# Patient Record
Sex: MALE | Race: WHITE | NOT HISPANIC OR LATINO | URBAN - METROPOLITAN AREA
[De-identification: names, ages, dates, MRNs, and addresses within clinical notes are randomized per-mention and may not be internally consistent; named-entity substitution may affect disease eponyms.]

---

## 2017-01-01 ENCOUNTER — INPATIENT (INPATIENT)
Facility: HOSPITAL | Age: 72
LOS: 2 days | End: 2017-03-08
Attending: INTERNAL MEDICINE | Admitting: INTERNAL MEDICINE
Payer: MEDICAID

## 2017-01-01 ENCOUNTER — INPATIENT (INPATIENT)
Facility: HOSPITAL | Age: 72
LOS: 11 days | Discharge: NURSING, REHAB OR LONG-TERM CARE | End: 2017-03-03
Attending: HOSPITALIST | Admitting: HOSPITALIST
Payer: MEDICAID

## 2017-01-01 VITALS
RESPIRATION RATE: 24 BRPM | TEMPERATURE: 99 F | DIASTOLIC BLOOD PRESSURE: 82 MMHG | OXYGEN SATURATION: 91 % | HEART RATE: 111 BPM | SYSTOLIC BLOOD PRESSURE: 142 MMHG

## 2017-01-01 VITALS
OXYGEN SATURATION: 85 % | TEMPERATURE: 99 F | RESPIRATION RATE: 40 BRPM | SYSTOLIC BLOOD PRESSURE: 117 MMHG | DIASTOLIC BLOOD PRESSURE: 67 MMHG | HEART RATE: 133 BPM

## 2017-01-01 VITALS
HEART RATE: 108 BPM | RESPIRATION RATE: 30 BRPM | DIASTOLIC BLOOD PRESSURE: 59 MMHG | OXYGEN SATURATION: 90 % | SYSTOLIC BLOOD PRESSURE: 130 MMHG

## 2017-01-01 VITALS
SYSTOLIC BLOOD PRESSURE: 129 MMHG | DIASTOLIC BLOOD PRESSURE: 76 MMHG | RESPIRATION RATE: 20 BRPM | OXYGEN SATURATION: 94 % | HEART RATE: 157 BPM

## 2017-01-01 DIAGNOSIS — I10 ESSENTIAL (PRIMARY) HYPERTENSION: ICD-10-CM

## 2017-01-01 DIAGNOSIS — J96.91 RESPIRATORY FAILURE, UNSPECIFIED WITH HYPOXIA: ICD-10-CM

## 2017-01-01 DIAGNOSIS — I25.10 ATHEROSCLEROTIC HEART DISEASE OF NATIVE CORONARY ARTERY WITHOUT ANGINA PECTORIS: ICD-10-CM

## 2017-01-01 DIAGNOSIS — Z41.8 ENCOUNTER FOR OTHER PROCEDURES FOR PURPOSES OTHER THAN REMEDYING HEALTH STATE: ICD-10-CM

## 2017-01-01 DIAGNOSIS — J18.9 PNEUMONIA, UNSPECIFIED ORGANISM: ICD-10-CM

## 2017-01-01 DIAGNOSIS — A41.9 SEPSIS, UNSPECIFIED ORGANISM: ICD-10-CM

## 2017-01-01 DIAGNOSIS — G20 PARKINSON'S DISEASE: ICD-10-CM

## 2017-01-01 DIAGNOSIS — N40.0 BENIGN PROSTATIC HYPERPLASIA WITHOUT LOWER URINARY TRACT SYMPTOMS: ICD-10-CM

## 2017-01-01 DIAGNOSIS — N39.0 URINARY TRACT INFECTION, SITE NOT SPECIFIED: ICD-10-CM

## 2017-01-01 DIAGNOSIS — J69.0 PNEUMONITIS DUE TO INHALATION OF FOOD AND VOMIT: ICD-10-CM

## 2017-01-01 DIAGNOSIS — Z71.89 OTHER SPECIFIED COUNSELING: ICD-10-CM

## 2017-01-01 DIAGNOSIS — F20.9 SCHIZOPHRENIA, UNSPECIFIED: ICD-10-CM

## 2017-01-01 DIAGNOSIS — E87.0 HYPEROSMOLALITY AND HYPERNATREMIA: ICD-10-CM

## 2017-01-01 DIAGNOSIS — R09.02 HYPOXEMIA: ICD-10-CM

## 2017-01-01 DIAGNOSIS — N17.9 ACUTE KIDNEY FAILURE, UNSPECIFIED: ICD-10-CM

## 2017-01-01 LAB
-  CEFAZOLIN: SIGNIFICANT CHANGE UP
-  CIPROFLOXACIN: SIGNIFICANT CHANGE UP
-  CLINDAMYCIN: SIGNIFICANT CHANGE UP
-  DAPTOMYCIN: SIGNIFICANT CHANGE UP
-  ERYTHROMYCIN: SIGNIFICANT CHANGE UP
-  GENTAMICIN: SIGNIFICANT CHANGE UP
-  LINEZOLID: SIGNIFICANT CHANGE UP
-  MOXIFLOXACIN(AEROBIC): SIGNIFICANT CHANGE UP
-  OXACILLIN: SIGNIFICANT CHANGE UP
-  PENICILLIN: SIGNIFICANT CHANGE UP
-  RIFAMPIN.: SIGNIFICANT CHANGE UP
-  TETRACYCLINE: SIGNIFICANT CHANGE UP
-  TRIMETHOPRIM/SULFAMETHOXAZOLE: SIGNIFICANT CHANGE UP
-  VANCOMYCIN: SIGNIFICANT CHANGE UP
ALBUMIN SERPL ELPH-MCNC: 2.5 G/DL — LOW (ref 3.3–5)
ALBUMIN SERPL ELPH-MCNC: 2.8 G/DL — LOW (ref 3.3–5)
ALBUMIN SERPL ELPH-MCNC: 2.9 G/DL — LOW (ref 3.3–5)
ALBUMIN SERPL ELPH-MCNC: 3.3 G/DL — SIGNIFICANT CHANGE UP (ref 3.3–5)
ALP SERPL-CCNC: 39 U/L — LOW (ref 40–120)
ALP SERPL-CCNC: 45 U/L — SIGNIFICANT CHANGE UP (ref 40–120)
ALP SERPL-CCNC: 51 U/L — SIGNIFICANT CHANGE UP (ref 40–120)
ALP SERPL-CCNC: 56 U/L — SIGNIFICANT CHANGE UP (ref 40–120)
ALT FLD-CCNC: 27 U/L — SIGNIFICANT CHANGE UP (ref 4–41)
ALT FLD-CCNC: 34 U/L — SIGNIFICANT CHANGE UP (ref 4–41)
ALT FLD-CCNC: 37 U/L — SIGNIFICANT CHANGE UP (ref 4–41)
ALT FLD-CCNC: 7 U/L — SIGNIFICANT CHANGE UP (ref 4–41)
APPEARANCE UR: CLEAR — SIGNIFICANT CHANGE UP
APPEARANCE UR: CLEAR — SIGNIFICANT CHANGE UP
APPEARANCE UR: SIGNIFICANT CHANGE UP
APTT BLD: 27.7 SEC — SIGNIFICANT CHANGE UP (ref 27.5–37.4)
AST SERPL-CCNC: 21 U/L — SIGNIFICANT CHANGE UP (ref 4–40)
AST SERPL-CCNC: 21 U/L — SIGNIFICANT CHANGE UP (ref 4–40)
AST SERPL-CCNC: 24 U/L — SIGNIFICANT CHANGE UP (ref 4–40)
AST SERPL-CCNC: 39 U/L — SIGNIFICANT CHANGE UP (ref 4–40)
B PERT DNA SPEC QL NAA+PROBE: SIGNIFICANT CHANGE UP
BACTERIA # UR AUTO: SIGNIFICANT CHANGE UP
BACTERIA # UR AUTO: SIGNIFICANT CHANGE UP
BACTERIA BLD CULT: SIGNIFICANT CHANGE UP
BACTERIA SPT RESP CULT: SIGNIFICANT CHANGE UP
BACTERIA UR CULT: SIGNIFICANT CHANGE UP
BASE EXCESS BLDA CALC-SCNC: -2.4 MMOL/L — SIGNIFICANT CHANGE UP
BASE EXCESS BLDV CALC-SCNC: -0.4 MMOL/L — SIGNIFICANT CHANGE UP
BASE EXCESS BLDV CALC-SCNC: -1.9 MMOL/L — SIGNIFICANT CHANGE UP
BASE EXCESS BLDV CALC-SCNC: -2.5 MMOL/L — SIGNIFICANT CHANGE UP
BASE EXCESS BLDV CALC-SCNC: 1.5 MMOL/L — SIGNIFICANT CHANGE UP
BASE EXCESS BLDV CALC-SCNC: 2 MMOL/L — SIGNIFICANT CHANGE UP
BASE EXCESS BLDV CALC-SCNC: 3.9 MMOL/L — SIGNIFICANT CHANGE UP
BASOPHILS # BLD AUTO: 0.03 K/UL — SIGNIFICANT CHANGE UP (ref 0–0.2)
BASOPHILS # BLD AUTO: 0.04 K/UL — SIGNIFICANT CHANGE UP (ref 0–0.2)
BASOPHILS # BLD AUTO: 0.09 K/UL — SIGNIFICANT CHANGE UP (ref 0–0.2)
BASOPHILS NFR BLD AUTO: 0.2 % — SIGNIFICANT CHANGE UP (ref 0–2)
BASOPHILS NFR BLD AUTO: 0.2 % — SIGNIFICANT CHANGE UP (ref 0–2)
BASOPHILS NFR BLD AUTO: 0.3 % — SIGNIFICANT CHANGE UP (ref 0–2)
BASOPHILS NFR BLD AUTO: 0.5 % — SIGNIFICANT CHANGE UP (ref 0–2)
BASOPHILS NFR BLD AUTO: 0.5 % — SIGNIFICANT CHANGE UP (ref 0–2)
BASOPHILS NFR SPEC: 0 % — SIGNIFICANT CHANGE UP (ref 0–2)
BASOPHILS NFR SPEC: 0 % — SIGNIFICANT CHANGE UP (ref 0–2)
BILIRUB SERPL-MCNC: 0.4 MG/DL — SIGNIFICANT CHANGE UP (ref 0.2–1.2)
BILIRUB SERPL-MCNC: 0.4 MG/DL — SIGNIFICANT CHANGE UP (ref 0.2–1.2)
BILIRUB SERPL-MCNC: 0.8 MG/DL — SIGNIFICANT CHANGE UP (ref 0.2–1.2)
BILIRUB SERPL-MCNC: 0.8 MG/DL — SIGNIFICANT CHANGE UP (ref 0.2–1.2)
BILIRUB UR-MCNC: NEGATIVE — SIGNIFICANT CHANGE UP
BLOOD GAS VENOUS - CREATININE: 0.59 MG/DL — SIGNIFICANT CHANGE UP (ref 0.5–1.3)
BLOOD GAS VENOUS - CREATININE: 0.79 MG/DL — SIGNIFICANT CHANGE UP (ref 0.5–1.3)
BLOOD GAS VENOUS - CREATININE: 0.96 MG/DL — SIGNIFICANT CHANGE UP (ref 0.5–1.3)
BLOOD GAS VENOUS - CREATININE: 1.73 MG/DL — HIGH (ref 0.5–1.3)
BLOOD UR QL VISUAL: HIGH
BLOOD UR QL VISUAL: HIGH
BLOOD UR QL VISUAL: NEGATIVE — SIGNIFICANT CHANGE UP
BUN SERPL-MCNC: 11 MG/DL — SIGNIFICANT CHANGE UP (ref 7–23)
BUN SERPL-MCNC: 13 MG/DL — SIGNIFICANT CHANGE UP (ref 7–23)
BUN SERPL-MCNC: 14 MG/DL — SIGNIFICANT CHANGE UP (ref 7–23)
BUN SERPL-MCNC: 14 MG/DL — SIGNIFICANT CHANGE UP (ref 7–23)
BUN SERPL-MCNC: 15 MG/DL — SIGNIFICANT CHANGE UP (ref 7–23)
BUN SERPL-MCNC: 17 MG/DL — SIGNIFICANT CHANGE UP (ref 7–23)
BUN SERPL-MCNC: 19 MG/DL — SIGNIFICANT CHANGE UP (ref 7–23)
BUN SERPL-MCNC: 21 MG/DL — SIGNIFICANT CHANGE UP (ref 7–23)
BUN SERPL-MCNC: 21 MG/DL — SIGNIFICANT CHANGE UP (ref 7–23)
BUN SERPL-MCNC: 23 MG/DL — SIGNIFICANT CHANGE UP (ref 7–23)
BUN SERPL-MCNC: 24 MG/DL — HIGH (ref 7–23)
BUN SERPL-MCNC: 26 MG/DL — HIGH (ref 7–23)
BUN SERPL-MCNC: 26 MG/DL — HIGH (ref 7–23)
BUN SERPL-MCNC: 27 MG/DL — HIGH (ref 7–23)
BUN SERPL-MCNC: 28 MG/DL — HIGH (ref 7–23)
BUN SERPL-MCNC: 29 MG/DL — HIGH (ref 7–23)
BUN SERPL-MCNC: 36 MG/DL — HIGH (ref 7–23)
BUN SERPL-MCNC: 41 MG/DL — HIGH (ref 7–23)
C PNEUM DNA SPEC QL NAA+PROBE: NOT DETECTED — SIGNIFICANT CHANGE UP
CALCIUM SERPL-MCNC: 8.1 MG/DL — LOW (ref 8.4–10.5)
CALCIUM SERPL-MCNC: 8.2 MG/DL — LOW (ref 8.4–10.5)
CALCIUM SERPL-MCNC: 8.2 MG/DL — LOW (ref 8.4–10.5)
CALCIUM SERPL-MCNC: 8.3 MG/DL — LOW (ref 8.4–10.5)
CALCIUM SERPL-MCNC: 8.4 MG/DL — SIGNIFICANT CHANGE UP (ref 8.4–10.5)
CALCIUM SERPL-MCNC: 8.5 MG/DL — SIGNIFICANT CHANGE UP (ref 8.4–10.5)
CALCIUM SERPL-MCNC: 8.6 MG/DL — SIGNIFICANT CHANGE UP (ref 8.4–10.5)
CALCIUM SERPL-MCNC: 8.6 MG/DL — SIGNIFICANT CHANGE UP (ref 8.4–10.5)
CALCIUM SERPL-MCNC: 8.8 MG/DL — SIGNIFICANT CHANGE UP (ref 8.4–10.5)
CALCIUM SERPL-MCNC: 9.1 MG/DL — SIGNIFICANT CHANGE UP (ref 8.4–10.5)
CALCIUM SERPL-MCNC: 9.2 MG/DL — SIGNIFICANT CHANGE UP (ref 8.4–10.5)
CHLORIDE BLDA-SCNC: > 120 MMOL/L — HIGH (ref 96–108)
CHLORIDE BLDV-SCNC: 106 MMOL/L — SIGNIFICANT CHANGE UP (ref 96–108)
CHLORIDE BLDV-SCNC: 119 MMOL/L — HIGH (ref 96–108)
CHLORIDE BLDV-SCNC: > 120 MMOL/L — HIGH (ref 96–108)
CHLORIDE BLDV-SCNC: > 120 MMOL/L — HIGH (ref 96–108)
CHLORIDE SERPL-SCNC: 103 MMOL/L — SIGNIFICANT CHANGE UP (ref 98–107)
CHLORIDE SERPL-SCNC: 104 MMOL/L — SIGNIFICANT CHANGE UP (ref 98–107)
CHLORIDE SERPL-SCNC: 105 MMOL/L — SIGNIFICANT CHANGE UP (ref 98–107)
CHLORIDE SERPL-SCNC: 105 MMOL/L — SIGNIFICANT CHANGE UP (ref 98–107)
CHLORIDE SERPL-SCNC: 106 MMOL/L — SIGNIFICANT CHANGE UP (ref 98–107)
CHLORIDE SERPL-SCNC: 106 MMOL/L — SIGNIFICANT CHANGE UP (ref 98–107)
CHLORIDE SERPL-SCNC: 108 MMOL/L — HIGH (ref 98–107)
CHLORIDE SERPL-SCNC: 109 MMOL/L — HIGH (ref 98–107)
CHLORIDE SERPL-SCNC: 110 MMOL/L — HIGH (ref 98–107)
CHLORIDE SERPL-SCNC: 111 MMOL/L — HIGH (ref 98–107)
CHLORIDE SERPL-SCNC: 111 MMOL/L — HIGH (ref 98–107)
CHLORIDE SERPL-SCNC: 112 MMOL/L — HIGH (ref 98–107)
CHLORIDE SERPL-SCNC: 113 MMOL/L — HIGH (ref 98–107)
CHLORIDE SERPL-SCNC: 114 MMOL/L — HIGH (ref 98–107)
CHLORIDE SERPL-SCNC: 116 MMOL/L — HIGH (ref 98–107)
CHLORIDE SERPL-SCNC: 117 MMOL/L — HIGH (ref 98–107)
CK MB BLD-MCNC: 1.8 — SIGNIFICANT CHANGE UP (ref 0–2.5)
CK MB BLD-MCNC: 2.2 — SIGNIFICANT CHANGE UP (ref 0–2.5)
CK MB BLD-MCNC: 4.56 NG/ML — SIGNIFICANT CHANGE UP (ref 1–6.6)
CK MB BLD-MCNC: 6.46 NG/ML — SIGNIFICANT CHANGE UP (ref 1–6.6)
CK SERPL-CCNC: 210 U/L — HIGH (ref 30–200)
CK SERPL-CCNC: 284 U/L — HIGH (ref 30–200)
CK SERPL-CCNC: 367 U/L — HIGH (ref 30–200)
CK SERPL-CCNC: 433 U/L — HIGH (ref 30–200)
CO2 SERPL-SCNC: 15 MMOL/L — LOW (ref 22–31)
CO2 SERPL-SCNC: 18 MMOL/L — LOW (ref 22–31)
CO2 SERPL-SCNC: 19 MMOL/L — LOW (ref 22–31)
CO2 SERPL-SCNC: 21 MMOL/L — LOW (ref 22–31)
CO2 SERPL-SCNC: 22 MMOL/L — SIGNIFICANT CHANGE UP (ref 22–31)
CO2 SERPL-SCNC: 23 MMOL/L — SIGNIFICANT CHANGE UP (ref 22–31)
CO2 SERPL-SCNC: 24 MMOL/L — SIGNIFICANT CHANGE UP (ref 22–31)
CO2 SERPL-SCNC: 25 MMOL/L — SIGNIFICANT CHANGE UP (ref 22–31)
COLOR SPEC: YELLOW — SIGNIFICANT CHANGE UP
CREAT BLDA-MCNC: 0.65 MG/DL — SIGNIFICANT CHANGE UP (ref 0.5–1.3)
CREAT SERPL-MCNC: 0.53 MG/DL — SIGNIFICANT CHANGE UP (ref 0.5–1.3)
CREAT SERPL-MCNC: 0.56 MG/DL — SIGNIFICANT CHANGE UP (ref 0.5–1.3)
CREAT SERPL-MCNC: 0.57 MG/DL — SIGNIFICANT CHANGE UP (ref 0.5–1.3)
CREAT SERPL-MCNC: 0.61 MG/DL — SIGNIFICANT CHANGE UP (ref 0.5–1.3)
CREAT SERPL-MCNC: 0.63 MG/DL — SIGNIFICANT CHANGE UP (ref 0.5–1.3)
CREAT SERPL-MCNC: 0.68 MG/DL — SIGNIFICANT CHANGE UP (ref 0.5–1.3)
CREAT SERPL-MCNC: 0.95 MG/DL — SIGNIFICANT CHANGE UP (ref 0.5–1.3)
CREAT SERPL-MCNC: 1.03 MG/DL — SIGNIFICANT CHANGE UP (ref 0.5–1.3)
CREAT SERPL-MCNC: 1.03 MG/DL — SIGNIFICANT CHANGE UP (ref 0.5–1.3)
CREAT SERPL-MCNC: 1.13 MG/DL — SIGNIFICANT CHANGE UP (ref 0.5–1.3)
CREAT SERPL-MCNC: 1.18 MG/DL — SIGNIFICANT CHANGE UP (ref 0.5–1.3)
CREAT SERPL-MCNC: 1.19 MG/DL — SIGNIFICANT CHANGE UP (ref 0.5–1.3)
CREAT SERPL-MCNC: 1.19 MG/DL — SIGNIFICANT CHANGE UP (ref 0.5–1.3)
CREAT SERPL-MCNC: 1.21 MG/DL — SIGNIFICANT CHANGE UP (ref 0.5–1.3)
CREAT SERPL-MCNC: 1.22 MG/DL — SIGNIFICANT CHANGE UP (ref 0.5–1.3)
CREAT SERPL-MCNC: 1.27 MG/DL — SIGNIFICANT CHANGE UP (ref 0.5–1.3)
CREAT SERPL-MCNC: 1.34 MG/DL — HIGH (ref 0.5–1.3)
CREAT SERPL-MCNC: 1.66 MG/DL — HIGH (ref 0.5–1.3)
EOSINOPHIL # BLD AUTO: 0 K/UL — SIGNIFICANT CHANGE UP (ref 0–0.5)
EOSINOPHIL # BLD AUTO: 0.01 K/UL — SIGNIFICANT CHANGE UP (ref 0–0.5)
EOSINOPHIL # BLD AUTO: 0.01 K/UL — SIGNIFICANT CHANGE UP (ref 0–0.5)
EOSINOPHIL # BLD AUTO: 0.05 K/UL — SIGNIFICANT CHANGE UP (ref 0–0.5)
EOSINOPHIL # BLD AUTO: 0.15 K/UL — SIGNIFICANT CHANGE UP (ref 0–0.5)
EOSINOPHIL NFR BLD AUTO: 0 % — SIGNIFICANT CHANGE UP (ref 0–6)
EOSINOPHIL NFR BLD AUTO: 0.1 % — SIGNIFICANT CHANGE UP (ref 0–6)
EOSINOPHIL NFR BLD AUTO: 0.1 % — SIGNIFICANT CHANGE UP (ref 0–6)
EOSINOPHIL NFR BLD AUTO: 0.5 % — SIGNIFICANT CHANGE UP (ref 0–6)
EOSINOPHIL NFR BLD AUTO: 1.8 % — SIGNIFICANT CHANGE UP (ref 0–6)
EOSINOPHIL NFR FLD: 0 % — SIGNIFICANT CHANGE UP (ref 0–6)
EOSINOPHIL NFR FLD: 0 % — SIGNIFICANT CHANGE UP (ref 0–6)
FLUAV H1 2009 PAND RNA SPEC QL NAA+PROBE: NOT DETECTED — SIGNIFICANT CHANGE UP
FLUAV H1 RNA SPEC QL NAA+PROBE: NOT DETECTED — SIGNIFICANT CHANGE UP
FLUAV H3 RNA SPEC QL NAA+PROBE: NOT DETECTED — SIGNIFICANT CHANGE UP
FLUAV SUBTYP SPEC NAA+PROBE: SIGNIFICANT CHANGE UP
FLUBV RNA SPEC QL NAA+PROBE: NOT DETECTED — SIGNIFICANT CHANGE UP
GAS PNL BLDV: 135 MMOL/L — LOW (ref 136–146)
GAS PNL BLDV: 143 MMOL/L — SIGNIFICANT CHANGE UP (ref 136–146)
GAS PNL BLDV: 145 MMOL/L — SIGNIFICANT CHANGE UP (ref 136–146)
GAS PNL BLDV: 147 MMOL/L — HIGH (ref 136–146)
GAS PNL BLDV: 147 MMOL/L — HIGH (ref 136–146)
GAS PNL BLDV: 152 MMOL/L — HIGH (ref 136–146)
GLUCOSE BLDA-MCNC: 129 MG/DL — HIGH (ref 70–99)
GLUCOSE BLDV-MCNC: 102 — HIGH (ref 70–99)
GLUCOSE BLDV-MCNC: 105 — HIGH (ref 70–99)
GLUCOSE BLDV-MCNC: 122 — HIGH (ref 70–99)
GLUCOSE BLDV-MCNC: 143 — HIGH (ref 70–99)
GLUCOSE BLDV-MCNC: 165 — HIGH (ref 70–99)
GLUCOSE BLDV-MCNC: 190 — HIGH (ref 70–99)
GLUCOSE SERPL-MCNC: 100 MG/DL — HIGH (ref 70–99)
GLUCOSE SERPL-MCNC: 106 MG/DL — HIGH (ref 70–99)
GLUCOSE SERPL-MCNC: 109 MG/DL — HIGH (ref 70–99)
GLUCOSE SERPL-MCNC: 109 MG/DL — HIGH (ref 70–99)
GLUCOSE SERPL-MCNC: 114 MG/DL — HIGH (ref 70–99)
GLUCOSE SERPL-MCNC: 119 MG/DL — HIGH (ref 70–99)
GLUCOSE SERPL-MCNC: 121 MG/DL — HIGH (ref 70–99)
GLUCOSE SERPL-MCNC: 131 MG/DL — HIGH (ref 70–99)
GLUCOSE SERPL-MCNC: 132 MG/DL — HIGH (ref 70–99)
GLUCOSE SERPL-MCNC: 154 MG/DL — HIGH (ref 70–99)
GLUCOSE SERPL-MCNC: 196 MG/DL — HIGH (ref 70–99)
GLUCOSE SERPL-MCNC: 66 MG/DL — LOW (ref 70–99)
GLUCOSE SERPL-MCNC: 83 MG/DL — SIGNIFICANT CHANGE UP (ref 70–99)
GLUCOSE SERPL-MCNC: 85 MG/DL — SIGNIFICANT CHANGE UP (ref 70–99)
GLUCOSE SERPL-MCNC: 92 MG/DL — SIGNIFICANT CHANGE UP (ref 70–99)
GLUCOSE SERPL-MCNC: 93 MG/DL — SIGNIFICANT CHANGE UP (ref 70–99)
GLUCOSE SERPL-MCNC: 97 MG/DL — SIGNIFICANT CHANGE UP (ref 70–99)
GLUCOSE SERPL-MCNC: 99 MG/DL — SIGNIFICANT CHANGE UP (ref 70–99)
GLUCOSE UR-MCNC: 150 — SIGNIFICANT CHANGE UP
GLUCOSE UR-MCNC: NEGATIVE — SIGNIFICANT CHANGE UP
GLUCOSE UR-MCNC: NEGATIVE — SIGNIFICANT CHANGE UP
GRAM STN SPT: SIGNIFICANT CHANGE UP
GRAN CASTS # UR COMP ASSIST: SIGNIFICANT CHANGE UP
HADV DNA SPEC QL NAA+PROBE: NOT DETECTED — SIGNIFICANT CHANGE UP
HCO3 BLDA-SCNC: 23 MMOL/L — SIGNIFICANT CHANGE UP (ref 22–26)
HCO3 BLDV-SCNC: 21 MMOL/L — SIGNIFICANT CHANGE UP (ref 20–27)
HCO3 BLDV-SCNC: 23 MMOL/L — SIGNIFICANT CHANGE UP (ref 20–27)
HCO3 BLDV-SCNC: 24 MMOL/L — SIGNIFICANT CHANGE UP (ref 20–27)
HCO3 BLDV-SCNC: 25 MMOL/L — SIGNIFICANT CHANGE UP (ref 20–27)
HCO3 BLDV-SCNC: 27 MMOL/L — SIGNIFICANT CHANGE UP (ref 20–27)
HCO3 BLDV-SCNC: 28 MMOL/L — HIGH (ref 20–27)
HCOV 229E RNA SPEC QL NAA+PROBE: NOT DETECTED — SIGNIFICANT CHANGE UP
HCOV HKU1 RNA SPEC QL NAA+PROBE: NOT DETECTED — SIGNIFICANT CHANGE UP
HCOV NL63 RNA SPEC QL NAA+PROBE: NOT DETECTED — SIGNIFICANT CHANGE UP
HCOV OC43 RNA SPEC QL NAA+PROBE: NOT DETECTED — SIGNIFICANT CHANGE UP
HCT VFR BLD CALC: 35 % — LOW (ref 39–50)
HCT VFR BLD CALC: 35.4 % — LOW (ref 39–50)
HCT VFR BLD CALC: 35.6 % — LOW (ref 39–50)
HCT VFR BLD CALC: 35.7 % — LOW (ref 39–50)
HCT VFR BLD CALC: 36.5 % — LOW (ref 39–50)
HCT VFR BLD CALC: 36.6 % — LOW (ref 39–50)
HCT VFR BLD CALC: 36.9 % — LOW (ref 39–50)
HCT VFR BLD CALC: 36.9 % — LOW (ref 39–50)
HCT VFR BLD CALC: 37.1 % — LOW (ref 39–50)
HCT VFR BLD CALC: 37.2 % — LOW (ref 39–50)
HCT VFR BLD CALC: 37.3 % — LOW (ref 39–50)
HCT VFR BLD CALC: 37.4 % — LOW (ref 39–50)
HCT VFR BLD CALC: 37.7 % — LOW (ref 39–50)
HCT VFR BLD CALC: 38 % — LOW (ref 39–50)
HCT VFR BLD CALC: 38.1 % — LOW (ref 39–50)
HCT VFR BLD CALC: 38.2 % — LOW (ref 39–50)
HCT VFR BLD CALC: 47.9 % — SIGNIFICANT CHANGE UP (ref 39–50)
HCT VFR BLD CALC: 49.7 % — SIGNIFICANT CHANGE UP (ref 39–50)
HCT VFR BLDA CALC: 38.7 % — LOW (ref 39–51)
HCT VFR BLDV CALC: 35.9 % — LOW (ref 39–51)
HCT VFR BLDV CALC: 36.7 % — LOW (ref 39–51)
HCT VFR BLDV CALC: 38.9 % — LOW (ref 39–51)
HCT VFR BLDV CALC: 40.6 % — SIGNIFICANT CHANGE UP (ref 39–51)
HCT VFR BLDV CALC: 45.9 % — SIGNIFICANT CHANGE UP (ref 39–51)
HCT VFR BLDV CALC: 48.1 % — SIGNIFICANT CHANGE UP (ref 39–51)
HGB BLD-MCNC: 11.5 G/DL — LOW (ref 13–17)
HGB BLD-MCNC: 11.6 G/DL — LOW (ref 13–17)
HGB BLD-MCNC: 11.7 G/DL — LOW (ref 13–17)
HGB BLD-MCNC: 11.7 G/DL — LOW (ref 13–17)
HGB BLD-MCNC: 11.8 G/DL — LOW (ref 13–17)
HGB BLD-MCNC: 11.9 G/DL — LOW (ref 13–17)
HGB BLD-MCNC: 12.1 G/DL — LOW (ref 13–17)
HGB BLD-MCNC: 12.2 G/DL — LOW (ref 13–17)
HGB BLD-MCNC: 12.3 G/DL — LOW (ref 13–17)
HGB BLD-MCNC: 12.3 G/DL — LOW (ref 13–17)
HGB BLD-MCNC: 12.7 G/DL — LOW (ref 13–17)
HGB BLD-MCNC: 12.7 G/DL — LOW (ref 13–17)
HGB BLD-MCNC: 15.5 G/DL — SIGNIFICANT CHANGE UP (ref 13–17)
HGB BLD-MCNC: 15.6 G/DL — SIGNIFICANT CHANGE UP (ref 13–17)
HGB BLDA-MCNC: 12.6 G/DL — LOW (ref 13–17)
HGB BLDV-MCNC: 11.6 G/DL — LOW (ref 13–17)
HGB BLDV-MCNC: 11.9 G/DL — LOW (ref 13–17)
HGB BLDV-MCNC: 12.7 G/DL — LOW (ref 13–17)
HGB BLDV-MCNC: 13.2 G/DL — SIGNIFICANT CHANGE UP (ref 13–17)
HGB BLDV-MCNC: 15 G/DL — SIGNIFICANT CHANGE UP (ref 13–17)
HGB BLDV-MCNC: 15.7 G/DL — SIGNIFICANT CHANGE UP (ref 13–17)
HIV1 AG SER QL: SIGNIFICANT CHANGE UP
HIV1+2 AB SPEC QL: SIGNIFICANT CHANGE UP
HMPV RNA SPEC QL NAA+PROBE: NOT DETECTED — SIGNIFICANT CHANGE UP
HPIV1 RNA SPEC QL NAA+PROBE: NOT DETECTED — SIGNIFICANT CHANGE UP
HPIV2 RNA SPEC QL NAA+PROBE: NOT DETECTED — SIGNIFICANT CHANGE UP
HPIV3 RNA SPEC QL NAA+PROBE: NOT DETECTED — SIGNIFICANT CHANGE UP
HPIV4 RNA SPEC QL NAA+PROBE: NOT DETECTED — SIGNIFICANT CHANGE UP
HYALINE CASTS # UR AUTO: SIGNIFICANT CHANGE UP (ref 0–?)
IMM GRANULOCYTES NFR BLD AUTO: 0.2 % — SIGNIFICANT CHANGE UP (ref 0–1.5)
IMM GRANULOCYTES NFR BLD AUTO: 0.3 % — SIGNIFICANT CHANGE UP (ref 0–1.5)
IMM GRANULOCYTES NFR BLD AUTO: 0.4 % — SIGNIFICANT CHANGE UP (ref 0–1.5)
IMM GRANULOCYTES NFR BLD AUTO: 0.5 % — SIGNIFICANT CHANGE UP (ref 0–1.5)
IMM GRANULOCYTES NFR BLD AUTO: 1.1 % — SIGNIFICANT CHANGE UP (ref 0–1.5)
INR BLD: 1.36 — HIGH (ref 0.87–1.18)
KETONES UR-MCNC: NEGATIVE — SIGNIFICANT CHANGE UP
KETONES UR-MCNC: NEGATIVE — SIGNIFICANT CHANGE UP
KETONES UR-MCNC: SIGNIFICANT CHANGE UP
L PNEUMO AG UR QL: NEGATIVE — SIGNIFICANT CHANGE UP
LACTATE BLDA-SCNC: 1.5 MMOL/L — SIGNIFICANT CHANGE UP (ref 0.5–2)
LACTATE BLDV-MCNC: 1.9 MMOL/L — SIGNIFICANT CHANGE UP (ref 0.5–2)
LACTATE BLDV-MCNC: 2.6 MMOL/L — HIGH (ref 0.5–2)
LACTATE BLDV-MCNC: 4.6 MMOL/L — CRITICAL HIGH (ref 0.5–2)
LACTATE BLDV-MCNC: 5.3 MMOL/L — CRITICAL HIGH (ref 0.5–2)
LEUKOCYTE ESTERASE UR-ACNC: HIGH
LEUKOCYTE ESTERASE UR-ACNC: NEGATIVE — SIGNIFICANT CHANGE UP
LEUKOCYTE ESTERASE UR-ACNC: NEGATIVE — SIGNIFICANT CHANGE UP
LYMPHOCYTES # BLD AUTO: 1.27 K/UL — SIGNIFICANT CHANGE UP (ref 1–3.3)
LYMPHOCYTES # BLD AUTO: 1.36 K/UL — SIGNIFICANT CHANGE UP (ref 1–3.3)
LYMPHOCYTES # BLD AUTO: 1.48 K/UL — SIGNIFICANT CHANGE UP (ref 1–3.3)
LYMPHOCYTES # BLD AUTO: 1.52 K/UL — SIGNIFICANT CHANGE UP (ref 1–3.3)
LYMPHOCYTES # BLD AUTO: 1.92 K/UL — SIGNIFICANT CHANGE UP (ref 1–3.3)
LYMPHOCYTES # BLD AUTO: 13.1 % — SIGNIFICANT CHANGE UP (ref 13–44)
LYMPHOCYTES # BLD AUTO: 14.6 % — SIGNIFICANT CHANGE UP (ref 13–44)
LYMPHOCYTES # BLD AUTO: 17.8 % — SIGNIFICANT CHANGE UP (ref 13–44)
LYMPHOCYTES # BLD AUTO: 6.5 % — LOW (ref 13–44)
LYMPHOCYTES # BLD AUTO: 8.8 % — LOW (ref 13–44)
LYMPHOCYTES NFR SPEC AUTO: 8 % — LOW (ref 13–44)
LYMPHOCYTES NFR SPEC AUTO: 9 % — LOW (ref 13–44)
M PNEUMO DNA SPEC QL NAA+PROBE: NOT DETECTED — SIGNIFICANT CHANGE UP
MAGNESIUM SERPL-MCNC: 2.1 MG/DL — SIGNIFICANT CHANGE UP (ref 1.6–2.6)
MAGNESIUM SERPL-MCNC: 2.2 MG/DL — SIGNIFICANT CHANGE UP (ref 1.6–2.6)
MAGNESIUM SERPL-MCNC: 2.3 MG/DL — SIGNIFICANT CHANGE UP (ref 1.6–2.6)
MAGNESIUM SERPL-MCNC: 2.4 MG/DL — SIGNIFICANT CHANGE UP (ref 1.6–2.6)
MANUAL SMEAR VERIFICATION: SIGNIFICANT CHANGE UP
MANUAL SMEAR VERIFICATION: SIGNIFICANT CHANGE UP
MCHC RBC-ENTMCNC: 29.4 PG — SIGNIFICANT CHANGE UP (ref 27–34)
MCHC RBC-ENTMCNC: 29.7 PG — SIGNIFICANT CHANGE UP (ref 27–34)
MCHC RBC-ENTMCNC: 29.8 PG — SIGNIFICANT CHANGE UP (ref 27–34)
MCHC RBC-ENTMCNC: 29.8 PG — SIGNIFICANT CHANGE UP (ref 27–34)
MCHC RBC-ENTMCNC: 29.9 PG — SIGNIFICANT CHANGE UP (ref 27–34)
MCHC RBC-ENTMCNC: 30 PG — SIGNIFICANT CHANGE UP (ref 27–34)
MCHC RBC-ENTMCNC: 30 PG — SIGNIFICANT CHANGE UP (ref 27–34)
MCHC RBC-ENTMCNC: 30.1 PG — SIGNIFICANT CHANGE UP (ref 27–34)
MCHC RBC-ENTMCNC: 30.1 PG — SIGNIFICANT CHANGE UP (ref 27–34)
MCHC RBC-ENTMCNC: 30.2 PG — SIGNIFICANT CHANGE UP (ref 27–34)
MCHC RBC-ENTMCNC: 30.3 PG — SIGNIFICANT CHANGE UP (ref 27–34)
MCHC RBC-ENTMCNC: 30.4 PG — SIGNIFICANT CHANGE UP (ref 27–34)
MCHC RBC-ENTMCNC: 30.5 PG — SIGNIFICANT CHANGE UP (ref 27–34)
MCHC RBC-ENTMCNC: 30.5 PG — SIGNIFICANT CHANGE UP (ref 27–34)
MCHC RBC-ENTMCNC: 30.7 PG — SIGNIFICANT CHANGE UP (ref 27–34)
MCHC RBC-ENTMCNC: 30.8 PG — SIGNIFICANT CHANGE UP (ref 27–34)
MCHC RBC-ENTMCNC: 30.9 % — LOW (ref 32–36)
MCHC RBC-ENTMCNC: 31.2 % — LOW (ref 32–36)
MCHC RBC-ENTMCNC: 31.8 % — LOW (ref 32–36)
MCHC RBC-ENTMCNC: 31.8 % — LOW (ref 32–36)
MCHC RBC-ENTMCNC: 32 % — SIGNIFICANT CHANGE UP (ref 32–36)
MCHC RBC-ENTMCNC: 32.1 % — SIGNIFICANT CHANGE UP (ref 32–36)
MCHC RBC-ENTMCNC: 32.2 % — SIGNIFICANT CHANGE UP (ref 32–36)
MCHC RBC-ENTMCNC: 32.3 % — SIGNIFICANT CHANGE UP (ref 32–36)
MCHC RBC-ENTMCNC: 32.5 % — SIGNIFICANT CHANGE UP (ref 32–36)
MCHC RBC-ENTMCNC: 32.6 % — SIGNIFICANT CHANGE UP (ref 32–36)
MCHC RBC-ENTMCNC: 32.8 % — SIGNIFICANT CHANGE UP (ref 32–36)
MCHC RBC-ENTMCNC: 32.9 % — SIGNIFICANT CHANGE UP (ref 32–36)
MCHC RBC-ENTMCNC: 33 % — SIGNIFICANT CHANGE UP (ref 32–36)
MCHC RBC-ENTMCNC: 33.1 % — SIGNIFICANT CHANGE UP (ref 32–36)
MCHC RBC-ENTMCNC: 33.2 % — SIGNIFICANT CHANGE UP (ref 32–36)
MCHC RBC-ENTMCNC: 33.4 % — SIGNIFICANT CHANGE UP (ref 32–36)
MCV RBC AUTO: 91.2 FL — SIGNIFICANT CHANGE UP (ref 80–100)
MCV RBC AUTO: 91.3 FL — SIGNIFICANT CHANGE UP (ref 80–100)
MCV RBC AUTO: 91.3 FL — SIGNIFICANT CHANGE UP (ref 80–100)
MCV RBC AUTO: 91.6 FL — SIGNIFICANT CHANGE UP (ref 80–100)
MCV RBC AUTO: 91.9 FL — SIGNIFICANT CHANGE UP (ref 80–100)
MCV RBC AUTO: 91.9 FL — SIGNIFICANT CHANGE UP (ref 80–100)
MCV RBC AUTO: 92 FL — SIGNIFICANT CHANGE UP (ref 80–100)
MCV RBC AUTO: 92.1 FL — SIGNIFICANT CHANGE UP (ref 80–100)
MCV RBC AUTO: 92.5 FL — SIGNIFICANT CHANGE UP (ref 80–100)
MCV RBC AUTO: 93.1 FL — SIGNIFICANT CHANGE UP (ref 80–100)
MCV RBC AUTO: 93.1 FL — SIGNIFICANT CHANGE UP (ref 80–100)
MCV RBC AUTO: 93.5 FL — SIGNIFICANT CHANGE UP (ref 80–100)
MCV RBC AUTO: 94.2 FL — SIGNIFICANT CHANGE UP (ref 80–100)
MCV RBC AUTO: 94.3 FL — SIGNIFICANT CHANGE UP (ref 80–100)
MCV RBC AUTO: 94.4 FL — SIGNIFICANT CHANGE UP (ref 80–100)
MCV RBC AUTO: 94.7 FL — SIGNIFICANT CHANGE UP (ref 80–100)
MCV RBC AUTO: 95.1 FL — SIGNIFICANT CHANGE UP (ref 80–100)
MCV RBC AUTO: 96.9 FL — SIGNIFICANT CHANGE UP (ref 80–100)
METHOD TYPE: SIGNIFICANT CHANGE UP
MONOCYTES # BLD AUTO: 0.51 K/UL — SIGNIFICANT CHANGE UP (ref 0–0.9)
MONOCYTES # BLD AUTO: 0.52 K/UL — SIGNIFICANT CHANGE UP (ref 0–0.9)
MONOCYTES # BLD AUTO: 0.71 K/UL — SIGNIFICANT CHANGE UP (ref 0–0.9)
MONOCYTES # BLD AUTO: 0.99 K/UL — HIGH (ref 0–0.9)
MONOCYTES # BLD AUTO: 1.63 K/UL — HIGH (ref 0–0.9)
MONOCYTES NFR BLD AUTO: 3.3 % — SIGNIFICANT CHANGE UP (ref 2–14)
MONOCYTES NFR BLD AUTO: 6.3 % — SIGNIFICANT CHANGE UP (ref 2–14)
MONOCYTES NFR BLD AUTO: 6.8 % — SIGNIFICANT CHANGE UP (ref 2–14)
MONOCYTES NFR BLD AUTO: 6.8 % — SIGNIFICANT CHANGE UP (ref 2–14)
MONOCYTES NFR BLD AUTO: 8.3 % — SIGNIFICANT CHANGE UP (ref 2–14)
MONOCYTES NFR BLD: 3 % — SIGNIFICANT CHANGE UP (ref 2–9)
MONOCYTES NFR BLD: 6 % — SIGNIFICANT CHANGE UP (ref 2–9)
MORPHOLOGY BLD-IMP: NORMAL — SIGNIFICANT CHANGE UP
MUCOUS THREADS # UR AUTO: SIGNIFICANT CHANGE UP
NEUTROPHIL AB SER-ACNC: 79 % — HIGH (ref 43–77)
NEUTROPHIL AB SER-ACNC: 84 % — HIGH (ref 43–77)
NEUTROPHILS # BLD AUTO: 11.6 K/UL — HIGH (ref 1.8–7.4)
NEUTROPHILS # BLD AUTO: 13.55 K/UL — HIGH (ref 1.8–7.4)
NEUTROPHILS # BLD AUTO: 16.36 K/UL — HIGH (ref 1.8–7.4)
NEUTROPHILS # BLD AUTO: 6.11 K/UL — SIGNIFICANT CHANGE UP (ref 1.8–7.4)
NEUTROPHILS # BLD AUTO: 8.1 K/UL — HIGH (ref 1.8–7.4)
NEUTROPHILS NFR BLD AUTO: 73.4 % — SIGNIFICANT CHANGE UP (ref 43–77)
NEUTROPHILS NFR BLD AUTO: 77.5 % — HIGH (ref 43–77)
NEUTROPHILS NFR BLD AUTO: 79.3 % — HIGH (ref 43–77)
NEUTROPHILS NFR BLD AUTO: 83.5 % — HIGH (ref 43–77)
NEUTROPHILS NFR BLD AUTO: 87.3 % — HIGH (ref 43–77)
NEUTS BAND # BLD: 1 % — SIGNIFICANT CHANGE UP (ref 0–6)
NEUTS BAND # BLD: 10 % — HIGH (ref 0–6)
NITRITE UR-MCNC: NEGATIVE — SIGNIFICANT CHANGE UP
NITRITE UR-MCNC: NEGATIVE — SIGNIFICANT CHANGE UP
NITRITE UR-MCNC: POSITIVE — HIGH
NON-SQ EPI CELLS # UR AUTO: <1 — SIGNIFICANT CHANGE UP
NT-PROBNP SERPL-SCNC: 455.8 PG/ML — SIGNIFICANT CHANGE UP
OB PNL STL: NEGATIVE — SIGNIFICANT CHANGE UP
ORGANISM # SPEC MICROSCOPIC CNT: SIGNIFICANT CHANGE UP
ORGANISM # SPEC MICROSCOPIC CNT: SIGNIFICANT CHANGE UP
PCO2 BLDA: 30 MMHG — LOW (ref 35–48)
PCO2 BLDV: 29 MMHG — LOW (ref 41–51)
PCO2 BLDV: 32 MMHG — LOW (ref 41–51)
PCO2 BLDV: 36 MMHG — LOW (ref 41–51)
PCO2 BLDV: 37 MMHG — LOW (ref 41–51)
PCO2 BLDV: 39 MMHG — LOW (ref 41–51)
PCO2 BLDV: 55 MMHG — HIGH (ref 41–51)
PH BLDA: 7.45 PH — SIGNIFICANT CHANGE UP (ref 7.35–7.45)
PH BLDV: 7.26 PH — LOW (ref 7.32–7.43)
PH BLDV: 7.43 PH — SIGNIFICANT CHANGE UP (ref 7.32–7.43)
PH BLDV: 7.43 PH — SIGNIFICANT CHANGE UP (ref 7.32–7.43)
PH BLDV: 7.48 PH — HIGH (ref 7.32–7.43)
PH BLDV: 7.48 PH — HIGH (ref 7.32–7.43)
PH BLDV: 7.51 PH — HIGH (ref 7.32–7.43)
PH UR: 5.5 — SIGNIFICANT CHANGE UP (ref 4.6–8)
PH UR: 5.5 — SIGNIFICANT CHANGE UP (ref 4.6–8)
PH UR: 6 — SIGNIFICANT CHANGE UP (ref 4.6–8)
PHOSPHATE SERPL-MCNC: 2 MG/DL — LOW (ref 2.5–4.5)
PHOSPHATE SERPL-MCNC: 2.6 MG/DL — SIGNIFICANT CHANGE UP (ref 2.5–4.5)
PHOSPHATE SERPL-MCNC: 2.6 MG/DL — SIGNIFICANT CHANGE UP (ref 2.5–4.5)
PHOSPHATE SERPL-MCNC: 2.8 MG/DL — SIGNIFICANT CHANGE UP (ref 2.5–4.5)
PHOSPHATE SERPL-MCNC: 2.9 MG/DL — SIGNIFICANT CHANGE UP (ref 2.5–4.5)
PHOSPHATE SERPL-MCNC: 3 MG/DL — SIGNIFICANT CHANGE UP (ref 2.5–4.5)
PHOSPHATE SERPL-MCNC: 3 MG/DL — SIGNIFICANT CHANGE UP (ref 2.5–4.5)
PHOSPHATE SERPL-MCNC: 3.1 MG/DL — SIGNIFICANT CHANGE UP (ref 2.5–4.5)
PHOSPHATE SERPL-MCNC: 3.2 MG/DL — SIGNIFICANT CHANGE UP (ref 2.5–4.5)
PHOSPHATE SERPL-MCNC: 3.2 MG/DL — SIGNIFICANT CHANGE UP (ref 2.5–4.5)
PHOSPHATE SERPL-MCNC: 3.3 MG/DL — SIGNIFICANT CHANGE UP (ref 2.5–4.5)
PHOSPHATE SERPL-MCNC: 3.4 MG/DL — SIGNIFICANT CHANGE UP (ref 2.5–4.5)
PHOSPHATE SERPL-MCNC: 3.6 MG/DL — SIGNIFICANT CHANGE UP (ref 2.5–4.5)
PHOSPHATE SERPL-MCNC: 4.3 MG/DL — SIGNIFICANT CHANGE UP (ref 2.5–4.5)
PLATELET # BLD AUTO: 204 K/UL — SIGNIFICANT CHANGE UP (ref 150–400)
PLATELET # BLD AUTO: 219 K/UL — SIGNIFICANT CHANGE UP (ref 150–400)
PLATELET # BLD AUTO: 223 K/UL — SIGNIFICANT CHANGE UP (ref 150–400)
PLATELET # BLD AUTO: 231 K/UL — SIGNIFICANT CHANGE UP (ref 150–400)
PLATELET # BLD AUTO: 240 K/UL — SIGNIFICANT CHANGE UP (ref 150–400)
PLATELET # BLD AUTO: 241 K/UL — SIGNIFICANT CHANGE UP (ref 150–400)
PLATELET # BLD AUTO: 248 K/UL — SIGNIFICANT CHANGE UP (ref 150–400)
PLATELET # BLD AUTO: 262 K/UL — SIGNIFICANT CHANGE UP (ref 150–400)
PLATELET # BLD AUTO: 268 K/UL — SIGNIFICANT CHANGE UP (ref 150–400)
PLATELET # BLD AUTO: 281 K/UL — SIGNIFICANT CHANGE UP (ref 150–400)
PLATELET # BLD AUTO: 289 K/UL — SIGNIFICANT CHANGE UP (ref 150–400)
PLATELET # BLD AUTO: 294 K/UL — SIGNIFICANT CHANGE UP (ref 150–400)
PLATELET # BLD AUTO: 321 K/UL — SIGNIFICANT CHANGE UP (ref 150–400)
PLATELET # BLD AUTO: 358 K/UL — SIGNIFICANT CHANGE UP (ref 150–400)
PLATELET # BLD AUTO: 386 K/UL — SIGNIFICANT CHANGE UP (ref 150–400)
PLATELET # BLD AUTO: 390 K/UL — SIGNIFICANT CHANGE UP (ref 150–400)
PLATELET # BLD AUTO: 396 K/UL — SIGNIFICANT CHANGE UP (ref 150–400)
PLATELET # BLD AUTO: 592 K/UL — HIGH (ref 150–400)
PLATELET COUNT - ESTIMATE: NORMAL — SIGNIFICANT CHANGE UP
PMV BLD: 10.7 FL — SIGNIFICANT CHANGE UP (ref 7–13)
PMV BLD: 10.9 FL — SIGNIFICANT CHANGE UP (ref 7–13)
PMV BLD: 10.9 FL — SIGNIFICANT CHANGE UP (ref 7–13)
PMV BLD: 11 FL — SIGNIFICANT CHANGE UP (ref 7–13)
PMV BLD: 11.1 FL — SIGNIFICANT CHANGE UP (ref 7–13)
PMV BLD: 11.2 FL — SIGNIFICANT CHANGE UP (ref 7–13)
PMV BLD: 11.2 FL — SIGNIFICANT CHANGE UP (ref 7–13)
PMV BLD: 11.3 FL — SIGNIFICANT CHANGE UP (ref 7–13)
PMV BLD: 11.4 FL — SIGNIFICANT CHANGE UP (ref 7–13)
PMV BLD: 11.4 FL — SIGNIFICANT CHANGE UP (ref 7–13)
PMV BLD: 11.5 FL — SIGNIFICANT CHANGE UP (ref 7–13)
PMV BLD: 11.8 FL — SIGNIFICANT CHANGE UP (ref 7–13)
PMV BLD: 11.8 FL — SIGNIFICANT CHANGE UP (ref 7–13)
PMV BLD: 12.2 FL — SIGNIFICANT CHANGE UP (ref 7–13)
PMV BLD: 12.5 FL — SIGNIFICANT CHANGE UP (ref 7–13)
PO2 BLDA: 85 MMHG — SIGNIFICANT CHANGE UP (ref 83–108)
PO2 BLDV: 40 MMHG — SIGNIFICANT CHANGE UP (ref 35–40)
PO2 BLDV: 43 MMHG — HIGH (ref 35–40)
PO2 BLDV: 45 MMHG — HIGH (ref 35–40)
PO2 BLDV: 51 MMHG — HIGH (ref 35–40)
PO2 BLDV: 60 MMHG — HIGH (ref 35–40)
PO2 BLDV: 72 MMHG — HIGH (ref 35–40)
POTASSIUM BLDA-SCNC: 3.7 MMOL/L — SIGNIFICANT CHANGE UP (ref 3.4–4.5)
POTASSIUM BLDV-SCNC: 2.9 MMOL/L — LOW (ref 3.4–4.5)
POTASSIUM BLDV-SCNC: 3.2 MMOL/L — LOW (ref 3.4–4.5)
POTASSIUM BLDV-SCNC: 3.2 MMOL/L — LOW (ref 3.4–4.5)
POTASSIUM BLDV-SCNC: 3.8 MMOL/L — SIGNIFICANT CHANGE UP (ref 3.4–4.5)
POTASSIUM BLDV-SCNC: 4.5 MMOL/L — SIGNIFICANT CHANGE UP (ref 3.4–4.5)
POTASSIUM BLDV-SCNC: 5 MMOL/L — HIGH (ref 3.4–4.5)
POTASSIUM SERPL-MCNC: 3.2 MMOL/L — LOW (ref 3.5–5.3)
POTASSIUM SERPL-MCNC: 3.3 MMOL/L — LOW (ref 3.5–5.3)
POTASSIUM SERPL-MCNC: 3.4 MMOL/L — LOW (ref 3.5–5.3)
POTASSIUM SERPL-MCNC: 3.5 MMOL/L — SIGNIFICANT CHANGE UP (ref 3.5–5.3)
POTASSIUM SERPL-MCNC: 3.5 MMOL/L — SIGNIFICANT CHANGE UP (ref 3.5–5.3)
POTASSIUM SERPL-MCNC: 3.6 MMOL/L — SIGNIFICANT CHANGE UP (ref 3.5–5.3)
POTASSIUM SERPL-MCNC: 3.6 MMOL/L — SIGNIFICANT CHANGE UP (ref 3.5–5.3)
POTASSIUM SERPL-MCNC: 3.8 MMOL/L — SIGNIFICANT CHANGE UP (ref 3.5–5.3)
POTASSIUM SERPL-MCNC: 3.8 MMOL/L — SIGNIFICANT CHANGE UP (ref 3.5–5.3)
POTASSIUM SERPL-MCNC: 3.9 MMOL/L — SIGNIFICANT CHANGE UP (ref 3.5–5.3)
POTASSIUM SERPL-MCNC: 4 MMOL/L — SIGNIFICANT CHANGE UP (ref 3.5–5.3)
POTASSIUM SERPL-MCNC: 4.1 MMOL/L — SIGNIFICANT CHANGE UP (ref 3.5–5.3)
POTASSIUM SERPL-MCNC: 4.2 MMOL/L — SIGNIFICANT CHANGE UP (ref 3.5–5.3)
POTASSIUM SERPL-MCNC: 4.2 MMOL/L — SIGNIFICANT CHANGE UP (ref 3.5–5.3)
POTASSIUM SERPL-MCNC: 5 MMOL/L — SIGNIFICANT CHANGE UP (ref 3.5–5.3)
POTASSIUM SERPL-MCNC: SIGNIFICANT CHANGE UP MMOL/L (ref 3.5–5.3)
POTASSIUM SERPL-SCNC: 3.2 MMOL/L — LOW (ref 3.5–5.3)
POTASSIUM SERPL-SCNC: 3.3 MMOL/L — LOW (ref 3.5–5.3)
POTASSIUM SERPL-SCNC: 3.4 MMOL/L — LOW (ref 3.5–5.3)
POTASSIUM SERPL-SCNC: 3.5 MMOL/L — SIGNIFICANT CHANGE UP (ref 3.5–5.3)
POTASSIUM SERPL-SCNC: 3.5 MMOL/L — SIGNIFICANT CHANGE UP (ref 3.5–5.3)
POTASSIUM SERPL-SCNC: 3.6 MMOL/L — SIGNIFICANT CHANGE UP (ref 3.5–5.3)
POTASSIUM SERPL-SCNC: 3.6 MMOL/L — SIGNIFICANT CHANGE UP (ref 3.5–5.3)
POTASSIUM SERPL-SCNC: 3.8 MMOL/L — SIGNIFICANT CHANGE UP (ref 3.5–5.3)
POTASSIUM SERPL-SCNC: 3.8 MMOL/L — SIGNIFICANT CHANGE UP (ref 3.5–5.3)
POTASSIUM SERPL-SCNC: 3.9 MMOL/L — SIGNIFICANT CHANGE UP (ref 3.5–5.3)
POTASSIUM SERPL-SCNC: 4 MMOL/L — SIGNIFICANT CHANGE UP (ref 3.5–5.3)
POTASSIUM SERPL-SCNC: 4.1 MMOL/L — SIGNIFICANT CHANGE UP (ref 3.5–5.3)
POTASSIUM SERPL-SCNC: 4.2 MMOL/L — SIGNIFICANT CHANGE UP (ref 3.5–5.3)
POTASSIUM SERPL-SCNC: 4.2 MMOL/L — SIGNIFICANT CHANGE UP (ref 3.5–5.3)
POTASSIUM SERPL-SCNC: 5 MMOL/L — SIGNIFICANT CHANGE UP (ref 3.5–5.3)
POTASSIUM SERPL-SCNC: SIGNIFICANT CHANGE UP MMOL/L (ref 3.5–5.3)
PROT SERPL-MCNC: 6.2 G/DL — SIGNIFICANT CHANGE UP (ref 6–8.3)
PROT SERPL-MCNC: 6.2 G/DL — SIGNIFICANT CHANGE UP (ref 6–8.3)
PROT SERPL-MCNC: 7.6 G/DL — SIGNIFICANT CHANGE UP (ref 6–8.3)
PROT SERPL-MCNC: 7.9 G/DL — SIGNIFICANT CHANGE UP (ref 6–8.3)
PROT UR-MCNC: 20 — SIGNIFICANT CHANGE UP
PROT UR-MCNC: 30 — SIGNIFICANT CHANGE UP
PROT UR-MCNC: NEGATIVE — SIGNIFICANT CHANGE UP
PROTHROM AB SERPL-ACNC: 15.6 SEC — HIGH (ref 10–13.1)
RBC # BLD: 3.78 M/UL — LOW (ref 4.2–5.8)
RBC # BLD: 3.8 M/UL — LOW (ref 4.2–5.8)
RBC # BLD: 3.85 M/UL — LOW (ref 4.2–5.8)
RBC # BLD: 3.88 M/UL — LOW (ref 4.2–5.8)
RBC # BLD: 3.91 M/UL — LOW (ref 4.2–5.8)
RBC # BLD: 3.93 M/UL — LOW (ref 4.2–5.8)
RBC # BLD: 3.96 M/UL — LOW (ref 4.2–5.8)
RBC # BLD: 3.97 M/UL — LOW (ref 4.2–5.8)
RBC # BLD: 3.97 M/UL — LOW (ref 4.2–5.8)
RBC # BLD: 3.99 M/UL — LOW (ref 4.2–5.8)
RBC # BLD: 4.04 M/UL — LOW (ref 4.2–5.8)
RBC # BLD: 4.04 M/UL — LOW (ref 4.2–5.8)
RBC # BLD: 4.05 M/UL — LOW (ref 4.2–5.8)
RBC # BLD: 4.09 M/UL — LOW (ref 4.2–5.8)
RBC # BLD: 4.16 M/UL — LOW (ref 4.2–5.8)
RBC # BLD: 4.17 M/UL — LOW (ref 4.2–5.8)
RBC # BLD: 5.06 M/UL — SIGNIFICANT CHANGE UP (ref 4.2–5.8)
RBC # BLD: 5.13 M/UL — SIGNIFICANT CHANGE UP (ref 4.2–5.8)
RBC # FLD: 14.2 % — SIGNIFICANT CHANGE UP (ref 10.3–14.5)
RBC # FLD: 14.4 % — SIGNIFICANT CHANGE UP (ref 10.3–14.5)
RBC # FLD: 14.6 % — HIGH (ref 10.3–14.5)
RBC # FLD: 14.7 % — HIGH (ref 10.3–14.5)
RBC # FLD: 14.8 % — HIGH (ref 10.3–14.5)
RBC # FLD: 14.8 % — HIGH (ref 10.3–14.5)
RBC # FLD: 14.9 % — HIGH (ref 10.3–14.5)
RBC # FLD: 15.1 % — HIGH (ref 10.3–14.5)
RBC # FLD: 15.1 % — HIGH (ref 10.3–14.5)
RBC # FLD: 15.2 % — HIGH (ref 10.3–14.5)
RBC # FLD: 15.2 % — HIGH (ref 10.3–14.5)
RBC # FLD: 15.5 % — HIGH (ref 10.3–14.5)
RBC # FLD: 15.5 % — HIGH (ref 10.3–14.5)
RBC # FLD: 15.7 % — HIGH (ref 10.3–14.5)
RBC # FLD: 15.8 % — HIGH (ref 10.3–14.5)
RBC CASTS # UR COMP ASSIST: HIGH (ref 0–?)
RBC CASTS # UR COMP ASSIST: HIGH (ref 0–?)
RBC CASTS # UR COMP ASSIST: SIGNIFICANT CHANGE UP (ref 0–?)
RSV RNA SPEC QL NAA+PROBE: NOT DETECTED — SIGNIFICANT CHANGE UP
RV+EV RNA SPEC QL NAA+PROBE: NOT DETECTED — SIGNIFICANT CHANGE UP
SAO2 % BLDA: 96.7 % — SIGNIFICANT CHANGE UP (ref 95–99)
SAO2 % BLDV: 71.2 % — SIGNIFICANT CHANGE UP (ref 60–85)
SAO2 % BLDV: 71.5 % — SIGNIFICANT CHANGE UP (ref 60–85)
SAO2 % BLDV: 72.9 % — SIGNIFICANT CHANGE UP (ref 60–85)
SAO2 % BLDV: 76.6 % — SIGNIFICANT CHANGE UP (ref 60–85)
SAO2 % BLDV: 92.2 % — HIGH (ref 60–85)
SAO2 % BLDV: 94.7 % — HIGH (ref 60–85)
SODIUM BLDA-SCNC: 143 MMOL/L — SIGNIFICANT CHANGE UP (ref 136–146)
SODIUM SERPL-SCNC: 138 MMOL/L — SIGNIFICANT CHANGE UP (ref 135–145)
SODIUM SERPL-SCNC: 140 MMOL/L — SIGNIFICANT CHANGE UP (ref 135–145)
SODIUM SERPL-SCNC: 141 MMOL/L — SIGNIFICANT CHANGE UP (ref 135–145)
SODIUM SERPL-SCNC: 142 MMOL/L — SIGNIFICANT CHANGE UP (ref 135–145)
SODIUM SERPL-SCNC: 143 MMOL/L — SIGNIFICANT CHANGE UP (ref 135–145)
SODIUM SERPL-SCNC: 143 MMOL/L — SIGNIFICANT CHANGE UP (ref 135–145)
SODIUM SERPL-SCNC: 145 MMOL/L — SIGNIFICANT CHANGE UP (ref 135–145)
SODIUM SERPL-SCNC: 145 MMOL/L — SIGNIFICANT CHANGE UP (ref 135–145)
SODIUM SERPL-SCNC: 146 MMOL/L — HIGH (ref 135–145)
SODIUM SERPL-SCNC: 146 MMOL/L — HIGH (ref 135–145)
SODIUM SERPL-SCNC: 147 MMOL/L — HIGH (ref 135–145)
SODIUM SERPL-SCNC: 148 MMOL/L — HIGH (ref 135–145)
SODIUM SERPL-SCNC: 149 MMOL/L — HIGH (ref 135–145)
SODIUM SERPL-SCNC: 150 MMOL/L — HIGH (ref 135–145)
SODIUM SERPL-SCNC: 153 MMOL/L — HIGH (ref 135–145)
SODIUM SERPL-SCNC: 154 MMOL/L — HIGH (ref 135–145)
SP GR SPEC: 1.01 — SIGNIFICANT CHANGE UP (ref 1–1.03)
SP GR SPEC: 1.02 — SIGNIFICANT CHANGE UP (ref 1–1.03)
SP GR SPEC: 1.03 — HIGH (ref 1–1.03)
SPECIMEN SOURCE: SIGNIFICANT CHANGE UP
SQUAMOUS # UR AUTO: SIGNIFICANT CHANGE UP
TROPONIN T SERPL-MCNC: 0.06 NG/ML — SIGNIFICANT CHANGE UP (ref 0–0.06)
TROPONIN T SERPL-MCNC: 0.08 NG/ML — HIGH (ref 0–0.06)
TROPONIN T SERPL-MCNC: 0.09 NG/ML — HIGH (ref 0–0.06)
TROPONIN T SERPL-MCNC: < 0.06 NG/ML — SIGNIFICANT CHANGE UP (ref 0–0.06)
TSH SERPL-MCNC: 1.35 UIU/ML — SIGNIFICANT CHANGE UP (ref 0.27–4.2)
UROBILINOGEN FLD QL: NORMAL E.U. — SIGNIFICANT CHANGE UP (ref 0.1–0.2)
VANCOMYCIN FLD-MCNC: 10.1 UG/ML — SIGNIFICANT CHANGE UP
VANCOMYCIN FLD-MCNC: 12.7 UG/ML — SIGNIFICANT CHANGE UP
VANCOMYCIN FLD-MCNC: 19.8 UG/ML — SIGNIFICANT CHANGE UP
VANCOMYCIN FLD-MCNC: 8.9 UG/ML — SIGNIFICANT CHANGE UP
VANCOMYCIN TROUGH SERPL-MCNC: 21 UG/ML — HIGH (ref 10–20)
VANCOMYCIN TROUGH SERPL-MCNC: 25.8 UG/ML — CRITICAL HIGH (ref 10–20)
VANCOMYCIN TROUGH SERPL-MCNC: 9.2 UG/ML — LOW (ref 10–20)
VANCOMYCIN TROUGH SERPL-MCNC: 9.8 UG/ML — LOW (ref 10–20)
WBC # BLD: 10.43 K/UL — SIGNIFICANT CHANGE UP (ref 3.8–10.5)
WBC # BLD: 10.44 K/UL — SIGNIFICANT CHANGE UP (ref 3.8–10.5)
WBC # BLD: 10.81 K/UL — HIGH (ref 3.8–10.5)
WBC # BLD: 10.93 K/UL — HIGH (ref 3.8–10.5)
WBC # BLD: 11.36 K/UL — HIGH (ref 3.8–10.5)
WBC # BLD: 11.74 K/UL — HIGH (ref 3.8–10.5)
WBC # BLD: 12.17 K/UL — HIGH (ref 3.8–10.5)
WBC # BLD: 14.62 K/UL — HIGH (ref 3.8–10.5)
WBC # BLD: 15.51 K/UL — HIGH (ref 3.8–10.5)
WBC # BLD: 16.57 K/UL — HIGH (ref 3.8–10.5)
WBC # BLD: 17.16 K/UL — HIGH (ref 3.8–10.5)
WBC # BLD: 19.18 K/UL — HIGH (ref 3.8–10.5)
WBC # BLD: 19.58 K/UL — HIGH (ref 3.8–10.5)
WBC # BLD: 7.07 K/UL — SIGNIFICANT CHANGE UP (ref 3.8–10.5)
WBC # BLD: 8.16 K/UL — SIGNIFICANT CHANGE UP (ref 3.8–10.5)
WBC # BLD: 8.32 K/UL — SIGNIFICANT CHANGE UP (ref 3.8–10.5)
WBC # BLD: 8.69 K/UL — SIGNIFICANT CHANGE UP (ref 3.8–10.5)
WBC # BLD: 8.92 K/UL — SIGNIFICANT CHANGE UP (ref 3.8–10.5)
WBC # FLD AUTO: 10.43 K/UL — SIGNIFICANT CHANGE UP (ref 3.8–10.5)
WBC # FLD AUTO: 10.44 K/UL — SIGNIFICANT CHANGE UP (ref 3.8–10.5)
WBC # FLD AUTO: 10.81 K/UL — HIGH (ref 3.8–10.5)
WBC # FLD AUTO: 10.93 K/UL — HIGH (ref 3.8–10.5)
WBC # FLD AUTO: 11.36 K/UL — HIGH (ref 3.8–10.5)
WBC # FLD AUTO: 11.74 K/UL — HIGH (ref 3.8–10.5)
WBC # FLD AUTO: 12.17 K/UL — HIGH (ref 3.8–10.5)
WBC # FLD AUTO: 14.62 K/UL — HIGH (ref 3.8–10.5)
WBC # FLD AUTO: 15.51 K/UL — HIGH (ref 3.8–10.5)
WBC # FLD AUTO: 16.57 K/UL — HIGH (ref 3.8–10.5)
WBC # FLD AUTO: 17.16 K/UL — HIGH (ref 3.8–10.5)
WBC # FLD AUTO: 19.18 K/UL — HIGH (ref 3.8–10.5)
WBC # FLD AUTO: 19.58 K/UL — HIGH (ref 3.8–10.5)
WBC # FLD AUTO: 7.07 K/UL — SIGNIFICANT CHANGE UP (ref 3.8–10.5)
WBC # FLD AUTO: 8.16 K/UL — SIGNIFICANT CHANGE UP (ref 3.8–10.5)
WBC # FLD AUTO: 8.32 K/UL — SIGNIFICANT CHANGE UP (ref 3.8–10.5)
WBC # FLD AUTO: 8.69 K/UL — SIGNIFICANT CHANGE UP (ref 3.8–10.5)
WBC # FLD AUTO: 8.92 K/UL — SIGNIFICANT CHANGE UP (ref 3.8–10.5)
WBC UR QL: HIGH (ref 0–?)
WBC UR QL: HIGH (ref 0–?)
WBC UR QL: SIGNIFICANT CHANGE UP (ref 0–?)

## 2017-01-01 PROCEDURE — 99232 SBSQ HOSP IP/OBS MODERATE 35: CPT

## 2017-01-01 PROCEDURE — 71010: CPT | Mod: 26

## 2017-01-01 PROCEDURE — 99232 SBSQ HOSP IP/OBS MODERATE 35: CPT | Mod: GC

## 2017-01-01 PROCEDURE — 99233 SBSQ HOSP IP/OBS HIGH 50: CPT | Mod: GC

## 2017-01-01 PROCEDURE — 99291 CRITICAL CARE FIRST HOUR: CPT

## 2017-01-01 PROCEDURE — 93010 ELECTROCARDIOGRAM REPORT: CPT

## 2017-01-01 PROCEDURE — 71250 CT THORAX DX C-: CPT | Mod: 26

## 2017-01-01 PROCEDURE — 74230 X-RAY XM SWLNG FUNCJ C+: CPT | Mod: 26

## 2017-01-01 PROCEDURE — 99222 1ST HOSP IP/OBS MODERATE 55: CPT

## 2017-01-01 PROCEDURE — 99239 HOSP IP/OBS DSCHRG MGMT >30: CPT

## 2017-01-01 RX ORDER — AZTREONAM 2 G
1 VIAL (EA) INJECTION
Qty: 16 | Refills: 0 | OUTPATIENT
Start: 2017-01-01 | End: 2017-03-11

## 2017-01-01 RX ORDER — SODIUM CHLORIDE 9 MG/ML
1000 INJECTION, SOLUTION INTRAVENOUS
Qty: 0 | Refills: 0 | Status: DISCONTINUED | OUTPATIENT
Start: 2017-01-01 | End: 2017-01-01

## 2017-01-01 RX ORDER — ACETAMINOPHEN 500 MG
650 TABLET ORAL ONCE
Qty: 0 | Refills: 0 | Status: COMPLETED | OUTPATIENT
Start: 2017-01-01 | End: 2017-01-01

## 2017-01-01 RX ORDER — SODIUM CHLORIDE 9 MG/ML
500 INJECTION, SOLUTION INTRAVENOUS ONCE
Qty: 0 | Refills: 0 | Status: COMPLETED | OUTPATIENT
Start: 2017-01-01 | End: 2017-01-01

## 2017-01-01 RX ORDER — POTASSIUM PHOSPHATE, MONOBASIC POTASSIUM PHOSPHATE, DIBASIC 236; 224 MG/ML; MG/ML
15 INJECTION, SOLUTION INTRAVENOUS ONCE
Qty: 0 | Refills: 0 | Status: COMPLETED | OUTPATIENT
Start: 2017-01-01 | End: 2017-01-01

## 2017-01-01 RX ORDER — METOPROLOL TARTRATE 50 MG
25 TABLET ORAL DAILY
Qty: 0 | Refills: 0 | Status: DISCONTINUED | OUTPATIENT
Start: 2017-01-01 | End: 2017-01-01

## 2017-01-01 RX ORDER — VANCOMYCIN HCL 1 G
1000 VIAL (EA) INTRAVENOUS ONCE
Qty: 0 | Refills: 0 | Status: DISCONTINUED | OUTPATIENT
Start: 2017-01-01 | End: 2017-01-01

## 2017-01-01 RX ORDER — VANCOMYCIN HCL 1 G
1000 VIAL (EA) INTRAVENOUS EVERY 24 HOURS
Qty: 0 | Refills: 0 | Status: DISCONTINUED | OUTPATIENT
Start: 2017-01-01 | End: 2017-01-01

## 2017-01-01 RX ORDER — GABAPENTIN 400 MG/1
100 CAPSULE ORAL THREE TIMES A DAY
Qty: 0 | Refills: 0 | Status: DISCONTINUED | OUTPATIENT
Start: 2017-01-01 | End: 2017-01-01

## 2017-01-01 RX ORDER — VANCOMYCIN HCL 1 G
VIAL (EA) INTRAVENOUS
Qty: 0 | Refills: 0 | Status: DISCONTINUED | OUTPATIENT
Start: 2017-01-01 | End: 2017-01-01

## 2017-01-01 RX ORDER — SODIUM CHLORIDE 9 MG/ML
1000 INJECTION INTRAMUSCULAR; INTRAVENOUS; SUBCUTANEOUS
Qty: 0 | Refills: 0 | Status: DISCONTINUED | OUTPATIENT
Start: 2017-01-01 | End: 2017-01-01

## 2017-01-01 RX ORDER — HEPARIN SODIUM 5000 [USP'U]/ML
5000 INJECTION INTRAVENOUS; SUBCUTANEOUS EVERY 8 HOURS
Qty: 0 | Refills: 0 | Status: DISCONTINUED | OUTPATIENT
Start: 2017-01-01 | End: 2017-01-01

## 2017-01-01 RX ORDER — DIVALPROEX SODIUM 500 MG/1
250 TABLET, DELAYED RELEASE ORAL DAILY
Qty: 0 | Refills: 0 | Status: DISCONTINUED | OUTPATIENT
Start: 2017-01-01 | End: 2017-01-01

## 2017-01-01 RX ORDER — CHLORHEXIDINE GLUCONATE 213 G/1000ML
1 SOLUTION TOPICAL DAILY
Qty: 0 | Refills: 0 | Status: DISCONTINUED | OUTPATIENT
Start: 2017-01-01 | End: 2017-01-01

## 2017-01-01 RX ORDER — PIPERACILLIN AND TAZOBACTAM 4; .5 G/20ML; G/20ML
3.38 INJECTION, POWDER, LYOPHILIZED, FOR SOLUTION INTRAVENOUS EVERY 12 HOURS
Qty: 0 | Refills: 0 | Status: DISCONTINUED | OUTPATIENT
Start: 2017-01-01 | End: 2017-01-01

## 2017-01-01 RX ORDER — VANCOMYCIN HCL 1 G
1000 VIAL (EA) INTRAVENOUS EVERY 8 HOURS
Qty: 0 | Refills: 0 | Status: DISCONTINUED | OUTPATIENT
Start: 2017-01-01 | End: 2017-01-01

## 2017-01-01 RX ORDER — ETOMIDATE 2 MG/ML
20 INJECTION INTRAVENOUS ONCE
Qty: 0 | Refills: 0 | Status: COMPLETED | OUTPATIENT
Start: 2017-01-01 | End: 2017-01-01

## 2017-01-01 RX ORDER — NYSTATIN 500MM UNIT
500000 POWDER (EA) MISCELLANEOUS
Qty: 0 | Refills: 0 | Status: DISCONTINUED | OUTPATIENT
Start: 2017-01-01 | End: 2017-01-01

## 2017-01-01 RX ORDER — ASPIRIN/CALCIUM CARB/MAGNESIUM 324 MG
81 TABLET ORAL DAILY
Qty: 0 | Refills: 0 | Status: DISCONTINUED | OUTPATIENT
Start: 2017-01-01 | End: 2017-01-01

## 2017-01-01 RX ORDER — PIPERACILLIN AND TAZOBACTAM 4; .5 G/20ML; G/20ML
3.38 INJECTION, POWDER, LYOPHILIZED, FOR SOLUTION INTRAVENOUS ONCE
Qty: 0 | Refills: 0 | Status: DISCONTINUED | OUTPATIENT
Start: 2017-01-01 | End: 2017-01-01

## 2017-01-01 RX ORDER — ACETAMINOPHEN 500 MG
650 TABLET ORAL EVERY 6 HOURS
Qty: 0 | Refills: 0 | Status: DISCONTINUED | OUTPATIENT
Start: 2017-01-01 | End: 2017-01-01

## 2017-01-01 RX ORDER — AZITHROMYCIN 500 MG/1
500 TABLET, FILM COATED ORAL EVERY 24 HOURS
Qty: 0 | Refills: 0 | Status: DISCONTINUED | OUTPATIENT
Start: 2017-01-01 | End: 2017-01-01

## 2017-01-01 RX ORDER — CIPROFLOXACIN LACTATE 400MG/40ML
400 VIAL (ML) INTRAVENOUS ONCE
Qty: 0 | Refills: 0 | Status: COMPLETED | OUTPATIENT
Start: 2017-01-01 | End: 2017-01-01

## 2017-01-01 RX ORDER — CEFEPIME 1 G/1
1000 INJECTION, POWDER, FOR SOLUTION INTRAMUSCULAR; INTRAVENOUS ONCE
Qty: 0 | Refills: 0 | Status: COMPLETED | OUTPATIENT
Start: 2017-01-01 | End: 2017-01-01

## 2017-01-01 RX ORDER — VANCOMYCIN HCL 1 G
1000 VIAL (EA) INTRAVENOUS ONCE
Qty: 0 | Refills: 0 | Status: COMPLETED | OUTPATIENT
Start: 2017-01-01 | End: 2017-01-01

## 2017-01-01 RX ORDER — MEMANTINE HYDROCHLORIDE 10 MG/1
10 TABLET ORAL
Qty: 0 | Refills: 0 | Status: DISCONTINUED | OUTPATIENT
Start: 2017-01-01 | End: 2017-01-01

## 2017-01-01 RX ORDER — PIPERACILLIN AND TAZOBACTAM 4; .5 G/20ML; G/20ML
3.38 INJECTION, POWDER, LYOPHILIZED, FOR SOLUTION INTRAVENOUS EVERY 8 HOURS
Qty: 0 | Refills: 0 | Status: DISCONTINUED | OUTPATIENT
Start: 2017-01-01 | End: 2017-01-01

## 2017-01-01 RX ORDER — VANCOMYCIN HCL 1 G
750 VIAL (EA) INTRAVENOUS ONCE
Qty: 0 | Refills: 0 | Status: COMPLETED | OUTPATIENT
Start: 2017-01-01 | End: 2017-01-01

## 2017-01-01 RX ORDER — PROPOFOL 10 MG/ML
15 INJECTION, EMULSION INTRAVENOUS
Qty: 1000 | Refills: 0 | Status: DISCONTINUED | OUTPATIENT
Start: 2017-01-01 | End: 2017-01-01

## 2017-01-01 RX ORDER — GABAPENTIN 400 MG/1
1 CAPSULE ORAL
Qty: 0 | Refills: 0 | COMMUNITY

## 2017-01-01 RX ORDER — TAMSULOSIN HYDROCHLORIDE 0.4 MG/1
0.4 CAPSULE ORAL AT BEDTIME
Qty: 0 | Refills: 0 | Status: DISCONTINUED | OUTPATIENT
Start: 2017-01-01 | End: 2017-01-01

## 2017-01-01 RX ORDER — POTASSIUM CHLORIDE 20 MEQ
10 PACKET (EA) ORAL ONCE
Qty: 0 | Refills: 0 | Status: COMPLETED | OUTPATIENT
Start: 2017-01-01 | End: 2017-01-01

## 2017-01-01 RX ORDER — ENOXAPARIN SODIUM 100 MG/ML
40 INJECTION SUBCUTANEOUS EVERY 24 HOURS
Qty: 0 | Refills: 0 | Status: DISCONTINUED | OUTPATIENT
Start: 2017-01-01 | End: 2017-01-01

## 2017-01-01 RX ORDER — FENTANYL CITRATE 50 UG/ML
50 INJECTION INTRAVENOUS ONCE
Qty: 0 | Refills: 0 | Status: DISCONTINUED | OUTPATIENT
Start: 2017-01-01 | End: 2017-01-01

## 2017-01-01 RX ORDER — VANCOMYCIN HCL 1 G
1250 VIAL (EA) INTRAVENOUS EVERY 12 HOURS
Qty: 0 | Refills: 0 | Status: DISCONTINUED | OUTPATIENT
Start: 2017-01-01 | End: 2017-01-01

## 2017-01-01 RX ORDER — MEMANTINE HYDROCHLORIDE 10 MG/1
1 TABLET ORAL
Qty: 0 | Refills: 0 | COMMUNITY

## 2017-01-01 RX ORDER — PIPERACILLIN AND TAZOBACTAM 4; .5 G/20ML; G/20ML
3.38 INJECTION, POWDER, LYOPHILIZED, FOR SOLUTION INTRAVENOUS ONCE
Qty: 0 | Refills: 0 | Status: COMPLETED | OUTPATIENT
Start: 2017-01-01 | End: 2017-01-01

## 2017-01-01 RX ORDER — DIVALPROEX SODIUM 500 MG/1
1 TABLET, DELAYED RELEASE ORAL
Qty: 0 | Refills: 0 | COMMUNITY

## 2017-01-01 RX ORDER — GABAPENTIN 400 MG/1
100 CAPSULE ORAL
Qty: 0 | Refills: 0 | Status: DISCONTINUED | OUTPATIENT
Start: 2017-01-01 | End: 2017-01-01

## 2017-01-01 RX ORDER — POTASSIUM CHLORIDE 20 MEQ
10 PACKET (EA) ORAL
Qty: 0 | Refills: 0 | Status: COMPLETED | OUTPATIENT
Start: 2017-01-01 | End: 2017-01-01

## 2017-01-01 RX ORDER — CARBIDOPA AND LEVODOPA 25; 100 MG/1; MG/1
1 TABLET ORAL THREE TIMES A DAY
Qty: 0 | Refills: 0 | Status: DISCONTINUED | OUTPATIENT
Start: 2017-01-01 | End: 2017-01-01

## 2017-01-01 RX ORDER — SODIUM CHLORIDE 9 MG/ML
1000 INJECTION INTRAMUSCULAR; INTRAVENOUS; SUBCUTANEOUS ONCE
Qty: 0 | Refills: 0 | Status: COMPLETED | OUTPATIENT
Start: 2017-01-01 | End: 2017-01-01

## 2017-01-01 RX ORDER — INFLUENZA VIRUS VACCINE 15; 15; 15; 15 UG/.5ML; UG/.5ML; UG/.5ML; UG/.5ML
0.5 SUSPENSION INTRAMUSCULAR ONCE
Qty: 0 | Refills: 0 | Status: DISCONTINUED | OUTPATIENT
Start: 2017-01-01 | End: 2017-01-01

## 2017-01-01 RX ORDER — ROBINUL 0.2 MG/ML
0.1 INJECTION INTRAMUSCULAR; INTRAVENOUS EVERY 6 HOURS
Qty: 0 | Refills: 0 | Status: DISCONTINUED | OUTPATIENT
Start: 2017-01-01 | End: 2017-01-01

## 2017-01-01 RX ORDER — TAMSULOSIN HYDROCHLORIDE 0.4 MG/1
1 CAPSULE ORAL
Qty: 0 | Refills: 0 | COMMUNITY

## 2017-01-01 RX ORDER — DONEPEZIL HYDROCHLORIDE 10 MG/1
5 TABLET, FILM COATED ORAL AT BEDTIME
Qty: 0 | Refills: 0 | Status: DISCONTINUED | OUTPATIENT
Start: 2017-01-01 | End: 2017-01-01

## 2017-01-01 RX ORDER — CARBIDOPA AND LEVODOPA 25; 100 MG/1; MG/1
1 TABLET ORAL
Qty: 0 | Refills: 0 | COMMUNITY

## 2017-01-01 RX ORDER — MORPHINE SULFATE 50 MG/1
4 CAPSULE, EXTENDED RELEASE ORAL EVERY 4 HOURS
Qty: 0 | Refills: 0 | Status: DISCONTINUED | OUTPATIENT
Start: 2017-01-01 | End: 2017-01-01

## 2017-01-01 RX ORDER — ROCURONIUM BROMIDE 10 MG/ML
60 VIAL (ML) INTRAVENOUS ONCE
Qty: 0 | Refills: 0 | Status: COMPLETED | OUTPATIENT
Start: 2017-01-01 | End: 2017-01-01

## 2017-01-01 RX ORDER — AZITHROMYCIN 500 MG/1
500 TABLET, FILM COATED ORAL ONCE
Qty: 0 | Refills: 0 | Status: COMPLETED | OUTPATIENT
Start: 2017-01-01 | End: 2017-01-01

## 2017-01-01 RX ORDER — VANCOMYCIN HCL 1 G
1000 VIAL (EA) INTRAVENOUS EVERY 12 HOURS
Qty: 0 | Refills: 0 | Status: DISCONTINUED | OUTPATIENT
Start: 2017-01-01 | End: 2017-01-01

## 2017-01-01 RX ORDER — POTASSIUM CHLORIDE 20 MEQ
40 PACKET (EA) ORAL
Qty: 0 | Refills: 0 | Status: COMPLETED | OUTPATIENT
Start: 2017-01-01 | End: 2017-01-01

## 2017-01-01 RX ORDER — AZITHROMYCIN 500 MG/1
TABLET, FILM COATED ORAL
Qty: 0 | Refills: 0 | Status: DISCONTINUED | OUTPATIENT
Start: 2017-01-01 | End: 2017-01-01

## 2017-01-01 RX ORDER — AZTREONAM 2 G
1 VIAL (EA) INJECTION
Qty: 1 | Refills: 0 | OUTPATIENT
Start: 2017-01-01 | End: 2017-03-11

## 2017-01-01 RX ORDER — METOPROLOL TARTRATE 50 MG
1 TABLET ORAL
Qty: 0 | Refills: 0 | COMMUNITY

## 2017-01-01 RX ADMIN — PIPERACILLIN AND TAZOBACTAM 25 GRAM(S): 4; .5 INJECTION, POWDER, LYOPHILIZED, FOR SOLUTION INTRAVENOUS at 21:44

## 2017-01-01 RX ADMIN — Medication 0.5 MILLIGRAM(S): at 23:05

## 2017-01-01 RX ADMIN — Medication 200 MILLIGRAM(S): at 15:49

## 2017-01-01 RX ADMIN — SODIUM CHLORIDE 125 MILLILITER(S): 9 INJECTION, SOLUTION INTRAVENOUS at 12:38

## 2017-01-01 RX ADMIN — GABAPENTIN 100 MILLIGRAM(S): 400 CAPSULE ORAL at 06:19

## 2017-01-01 RX ADMIN — Medication 1 TABLET(S): at 12:09

## 2017-01-01 RX ADMIN — DIVALPROEX SODIUM 250 MILLIGRAM(S): 500 TABLET, DELAYED RELEASE ORAL at 14:20

## 2017-01-01 RX ADMIN — AZITHROMYCIN 250 MILLIGRAM(S): 500 TABLET, FILM COATED ORAL at 16:47

## 2017-01-01 RX ADMIN — Medication 100 MILLIEQUIVALENT(S): at 10:39

## 2017-01-01 RX ADMIN — SODIUM CHLORIDE 125 MILLILITER(S): 9 INJECTION, SOLUTION INTRAVENOUS at 16:48

## 2017-01-01 RX ADMIN — SODIUM CHLORIDE 100 MILLILITER(S): 9 INJECTION, SOLUTION INTRAVENOUS at 08:15

## 2017-01-01 RX ADMIN — SODIUM CHLORIDE 100 MILLILITER(S): 9 INJECTION INTRAMUSCULAR; INTRAVENOUS; SUBCUTANEOUS at 06:19

## 2017-01-01 RX ADMIN — Medication 500000 UNIT(S): at 05:28

## 2017-01-01 RX ADMIN — Medication 250 MILLIGRAM(S): at 20:56

## 2017-01-01 RX ADMIN — PIPERACILLIN AND TAZOBACTAM 25 GRAM(S): 4; .5 INJECTION, POWDER, LYOPHILIZED, FOR SOLUTION INTRAVENOUS at 21:26

## 2017-01-01 RX ADMIN — Medication 500000 UNIT(S): at 18:51

## 2017-01-01 RX ADMIN — Medication 650 MILLIGRAM(S): at 11:20

## 2017-01-01 RX ADMIN — CHLORHEXIDINE GLUCONATE 1 APPLICATION(S): 213 SOLUTION TOPICAL at 12:30

## 2017-01-01 RX ADMIN — GABAPENTIN 100 MILLIGRAM(S): 400 CAPSULE ORAL at 21:44

## 2017-01-01 RX ADMIN — Medication 40 MILLIEQUIVALENT(S): at 08:58

## 2017-01-01 RX ADMIN — Medication 166.67 MILLIGRAM(S): at 19:09

## 2017-01-01 RX ADMIN — CARBIDOPA AND LEVODOPA 1 TABLET(S): 25; 100 TABLET ORAL at 06:16

## 2017-01-01 RX ADMIN — PIPERACILLIN AND TAZOBACTAM 25 GRAM(S): 4; .5 INJECTION, POWDER, LYOPHILIZED, FOR SOLUTION INTRAVENOUS at 05:46

## 2017-01-01 RX ADMIN — MORPHINE SULFATE 4 MILLIGRAM(S): 50 CAPSULE, EXTENDED RELEASE ORAL at 15:29

## 2017-01-01 RX ADMIN — ENOXAPARIN SODIUM 40 MILLIGRAM(S): 100 INJECTION SUBCUTANEOUS at 21:13

## 2017-01-01 RX ADMIN — ENOXAPARIN SODIUM 40 MILLIGRAM(S): 100 INJECTION SUBCUTANEOUS at 21:21

## 2017-01-01 RX ADMIN — Medication 1 TABLET(S): at 18:16

## 2017-01-01 RX ADMIN — HEPARIN SODIUM 5000 UNIT(S): 5000 INJECTION INTRAVENOUS; SUBCUTANEOUS at 05:04

## 2017-01-01 RX ADMIN — PIPERACILLIN AND TAZOBACTAM 25 GRAM(S): 4; .5 INJECTION, POWDER, LYOPHILIZED, FOR SOLUTION INTRAVENOUS at 00:20

## 2017-01-01 RX ADMIN — Medication 250 MILLIGRAM(S): at 15:59

## 2017-01-01 RX ADMIN — HEPARIN SODIUM 5000 UNIT(S): 5000 INJECTION INTRAVENOUS; SUBCUTANEOUS at 13:04

## 2017-01-01 RX ADMIN — MEMANTINE HYDROCHLORIDE 10 MILLIGRAM(S): 10 TABLET ORAL at 18:25

## 2017-01-01 RX ADMIN — AZITHROMYCIN 250 MILLIGRAM(S): 500 TABLET, FILM COATED ORAL at 18:24

## 2017-01-01 RX ADMIN — HEPARIN SODIUM 5000 UNIT(S): 5000 INJECTION INTRAVENOUS; SUBCUTANEOUS at 14:20

## 2017-01-01 RX ADMIN — FENTANYL CITRATE 50 MICROGRAM(S): 50 INJECTION INTRAVENOUS at 17:15

## 2017-01-01 RX ADMIN — PIPERACILLIN AND TAZOBACTAM 25 GRAM(S): 4; .5 INJECTION, POWDER, LYOPHILIZED, FOR SOLUTION INTRAVENOUS at 21:51

## 2017-01-01 RX ADMIN — MEMANTINE HYDROCHLORIDE 10 MILLIGRAM(S): 10 TABLET ORAL at 07:32

## 2017-01-01 RX ADMIN — PIPERACILLIN AND TAZOBACTAM 25 GRAM(S): 4; .5 INJECTION, POWDER, LYOPHILIZED, FOR SOLUTION INTRAVENOUS at 07:58

## 2017-01-01 RX ADMIN — DONEPEZIL HYDROCHLORIDE 5 MILLIGRAM(S): 10 TABLET, FILM COATED ORAL at 23:35

## 2017-01-01 RX ADMIN — Medication 500000 UNIT(S): at 05:21

## 2017-01-01 RX ADMIN — DIVALPROEX SODIUM 250 MILLIGRAM(S): 500 TABLET, DELAYED RELEASE ORAL at 13:35

## 2017-01-01 RX ADMIN — PIPERACILLIN AND TAZOBACTAM 25 GRAM(S): 4; .5 INJECTION, POWDER, LYOPHILIZED, FOR SOLUTION INTRAVENOUS at 10:44

## 2017-01-01 RX ADMIN — PIPERACILLIN AND TAZOBACTAM 25 GRAM(S): 4; .5 INJECTION, POWDER, LYOPHILIZED, FOR SOLUTION INTRAVENOUS at 21:13

## 2017-01-01 RX ADMIN — Medication 81 MILLIGRAM(S): at 13:35

## 2017-01-01 RX ADMIN — Medication 500000 UNIT(S): at 23:05

## 2017-01-01 RX ADMIN — Medication 81 MILLIGRAM(S): at 12:42

## 2017-01-01 RX ADMIN — Medication 81 MILLIGRAM(S): at 11:39

## 2017-01-01 RX ADMIN — CARBIDOPA AND LEVODOPA 1 TABLET(S): 25; 100 TABLET ORAL at 13:06

## 2017-01-01 RX ADMIN — Medication 1 TABLET(S): at 06:16

## 2017-01-01 RX ADMIN — HEPARIN SODIUM 5000 UNIT(S): 5000 INJECTION INTRAVENOUS; SUBCUTANEOUS at 06:14

## 2017-01-01 RX ADMIN — HEPARIN SODIUM 5000 UNIT(S): 5000 INJECTION INTRAVENOUS; SUBCUTANEOUS at 06:16

## 2017-01-01 RX ADMIN — TAMSULOSIN HYDROCHLORIDE 0.4 MILLIGRAM(S): 0.4 CAPSULE ORAL at 23:34

## 2017-01-01 RX ADMIN — CARBIDOPA AND LEVODOPA 1 TABLET(S): 25; 100 TABLET ORAL at 21:28

## 2017-01-01 RX ADMIN — AZITHROMYCIN 250 MILLIGRAM(S): 500 TABLET, FILM COATED ORAL at 15:13

## 2017-01-01 RX ADMIN — FENTANYL CITRATE 50 MICROGRAM(S): 50 INJECTION INTRAVENOUS at 17:16

## 2017-01-01 RX ADMIN — CARBIDOPA AND LEVODOPA 1 TABLET(S): 25; 100 TABLET ORAL at 06:35

## 2017-01-01 RX ADMIN — Medication 0.5 MILLIGRAM(S): at 23:09

## 2017-01-01 RX ADMIN — ENOXAPARIN SODIUM 40 MILLIGRAM(S): 100 INJECTION SUBCUTANEOUS at 21:27

## 2017-01-01 RX ADMIN — Medication 81 MILLIGRAM(S): at 12:39

## 2017-01-01 RX ADMIN — HEPARIN SODIUM 5000 UNIT(S): 5000 INJECTION INTRAVENOUS; SUBCUTANEOUS at 13:35

## 2017-01-01 RX ADMIN — CARBIDOPA AND LEVODOPA 1 TABLET(S): 25; 100 TABLET ORAL at 21:21

## 2017-01-01 RX ADMIN — Medication 650 MILLIGRAM(S): at 02:37

## 2017-01-01 RX ADMIN — PIPERACILLIN AND TAZOBACTAM 25 GRAM(S): 4; .5 INJECTION, POWDER, LYOPHILIZED, FOR SOLUTION INTRAVENOUS at 02:04

## 2017-01-01 RX ADMIN — Medication 500000 UNIT(S): at 05:47

## 2017-01-01 RX ADMIN — CARBIDOPA AND LEVODOPA 1 TABLET(S): 25; 100 TABLET ORAL at 13:00

## 2017-01-01 RX ADMIN — Medication 500000 UNIT(S): at 20:51

## 2017-01-01 RX ADMIN — Medication 650 MILLIGRAM(S): at 06:30

## 2017-01-01 RX ADMIN — Medication 500000 UNIT(S): at 06:16

## 2017-01-01 RX ADMIN — MORPHINE SULFATE 4 MILLIGRAM(S): 50 CAPSULE, EXTENDED RELEASE ORAL at 15:14

## 2017-01-01 RX ADMIN — SODIUM CHLORIDE 75 MILLILITER(S): 9 INJECTION, SOLUTION INTRAVENOUS at 13:35

## 2017-01-01 RX ADMIN — Medication 250 MILLIGRAM(S): at 06:00

## 2017-01-01 RX ADMIN — PIPERACILLIN AND TAZOBACTAM 200 GRAM(S): 4; .5 INJECTION, POWDER, LYOPHILIZED, FOR SOLUTION INTRAVENOUS at 13:18

## 2017-01-01 RX ADMIN — CARBIDOPA AND LEVODOPA 1 TABLET(S): 25; 100 TABLET ORAL at 14:46

## 2017-01-01 RX ADMIN — PIPERACILLIN AND TAZOBACTAM 25 GRAM(S): 4; .5 INJECTION, POWDER, LYOPHILIZED, FOR SOLUTION INTRAVENOUS at 23:09

## 2017-01-01 RX ADMIN — PIPERACILLIN AND TAZOBACTAM 200 GRAM(S): 4; .5 INJECTION, POWDER, LYOPHILIZED, FOR SOLUTION INTRAVENOUS at 21:43

## 2017-01-01 RX ADMIN — CARBIDOPA AND LEVODOPA 1 TABLET(S): 25; 100 TABLET ORAL at 23:09

## 2017-01-01 RX ADMIN — ETOMIDATE 20 MILLIGRAM(S): 2 INJECTION INTRAVENOUS at 15:23

## 2017-01-01 RX ADMIN — SODIUM CHLORIDE 75 MILLILITER(S): 9 INJECTION, SOLUTION INTRAVENOUS at 09:39

## 2017-01-01 RX ADMIN — PIPERACILLIN AND TAZOBACTAM 25 GRAM(S): 4; .5 INJECTION, POWDER, LYOPHILIZED, FOR SOLUTION INTRAVENOUS at 13:43

## 2017-01-01 RX ADMIN — PROPOFOL 5.4 MICROGRAM(S)/KG/MIN: 10 INJECTION, EMULSION INTRAVENOUS at 06:00

## 2017-01-01 RX ADMIN — Medication 500000 UNIT(S): at 12:47

## 2017-01-01 RX ADMIN — Medication 25 MILLIGRAM(S): at 07:32

## 2017-01-01 RX ADMIN — SODIUM CHLORIDE 1000 MILLILITER(S): 9 INJECTION INTRAMUSCULAR; INTRAVENOUS; SUBCUTANEOUS at 15:14

## 2017-01-01 RX ADMIN — Medication 81 MILLIGRAM(S): at 13:30

## 2017-01-01 RX ADMIN — HEPARIN SODIUM 5000 UNIT(S): 5000 INJECTION INTRAVENOUS; SUBCUTANEOUS at 21:05

## 2017-01-01 RX ADMIN — SODIUM CHLORIDE 75 MILLILITER(S): 9 INJECTION, SOLUTION INTRAVENOUS at 05:22

## 2017-01-01 RX ADMIN — HEPARIN SODIUM 5000 UNIT(S): 5000 INJECTION INTRAVENOUS; SUBCUTANEOUS at 07:32

## 2017-01-01 RX ADMIN — CARBIDOPA AND LEVODOPA 1 TABLET(S): 25; 100 TABLET ORAL at 21:08

## 2017-01-01 RX ADMIN — CARBIDOPA AND LEVODOPA 1 TABLET(S): 25; 100 TABLET ORAL at 21:51

## 2017-01-01 RX ADMIN — CARBIDOPA AND LEVODOPA 1 TABLET(S): 25; 100 TABLET ORAL at 21:39

## 2017-01-01 RX ADMIN — PROPOFOL 5.4 MICROGRAM(S)/KG/MIN: 10 INJECTION, EMULSION INTRAVENOUS at 16:39

## 2017-01-01 RX ADMIN — SODIUM CHLORIDE 125 MILLILITER(S): 9 INJECTION, SOLUTION INTRAVENOUS at 23:17

## 2017-01-01 RX ADMIN — Medication 500000 UNIT(S): at 23:31

## 2017-01-01 RX ADMIN — DIVALPROEX SODIUM 250 MILLIGRAM(S): 500 TABLET, DELAYED RELEASE ORAL at 13:30

## 2017-01-01 RX ADMIN — CHLORHEXIDINE GLUCONATE 1 APPLICATION(S): 213 SOLUTION TOPICAL at 12:56

## 2017-01-01 RX ADMIN — HEPARIN SODIUM 5000 UNIT(S): 5000 INJECTION INTRAVENOUS; SUBCUTANEOUS at 23:34

## 2017-01-01 RX ADMIN — SODIUM CHLORIDE 75 MILLILITER(S): 9 INJECTION, SOLUTION INTRAVENOUS at 11:01

## 2017-01-01 RX ADMIN — HEPARIN SODIUM 5000 UNIT(S): 5000 INJECTION INTRAVENOUS; SUBCUTANEOUS at 05:07

## 2017-01-01 RX ADMIN — AZITHROMYCIN 250 MILLIGRAM(S): 500 TABLET, FILM COATED ORAL at 22:36

## 2017-01-01 RX ADMIN — Medication 500000 UNIT(S): at 12:30

## 2017-01-01 RX ADMIN — HEPARIN SODIUM 5000 UNIT(S): 5000 INJECTION INTRAVENOUS; SUBCUTANEOUS at 21:08

## 2017-01-01 RX ADMIN — DIVALPROEX SODIUM 250 MILLIGRAM(S): 500 TABLET, DELAYED RELEASE ORAL at 11:39

## 2017-01-01 RX ADMIN — Medication 650 MILLIGRAM(S): at 22:30

## 2017-01-01 RX ADMIN — SODIUM CHLORIDE 75 MILLILITER(S): 9 INJECTION INTRAMUSCULAR; INTRAVENOUS; SUBCUTANEOUS at 01:54

## 2017-01-01 RX ADMIN — PIPERACILLIN AND TAZOBACTAM 25 GRAM(S): 4; .5 INJECTION, POWDER, LYOPHILIZED, FOR SOLUTION INTRAVENOUS at 20:13

## 2017-01-01 RX ADMIN — CARBIDOPA AND LEVODOPA 1 TABLET(S): 25; 100 TABLET ORAL at 06:19

## 2017-01-01 RX ADMIN — ENOXAPARIN SODIUM 40 MILLIGRAM(S): 100 INJECTION SUBCUTANEOUS at 21:39

## 2017-01-01 RX ADMIN — Medication 500000 UNIT(S): at 23:35

## 2017-01-01 RX ADMIN — CHLORHEXIDINE GLUCONATE 1 APPLICATION(S): 213 SOLUTION TOPICAL at 14:19

## 2017-01-01 RX ADMIN — CEFEPIME 100 MILLIGRAM(S): 1 INJECTION, POWDER, FOR SOLUTION INTRAMUSCULAR; INTRAVENOUS at 15:21

## 2017-01-01 RX ADMIN — CARBIDOPA AND LEVODOPA 1 TABLET(S): 25; 100 TABLET ORAL at 07:32

## 2017-01-01 RX ADMIN — Medication 650 MILLIGRAM(S): at 16:37

## 2017-01-01 RX ADMIN — CHLORHEXIDINE GLUCONATE 1 APPLICATION(S): 213 SOLUTION TOPICAL at 12:16

## 2017-01-01 RX ADMIN — Medication 25 MILLIGRAM(S): at 06:13

## 2017-01-01 RX ADMIN — CARBIDOPA AND LEVODOPA 1 TABLET(S): 25; 100 TABLET ORAL at 08:01

## 2017-01-01 RX ADMIN — GABAPENTIN 100 MILLIGRAM(S): 400 CAPSULE ORAL at 07:32

## 2017-01-01 RX ADMIN — Medication 500000 UNIT(S): at 17:08

## 2017-01-01 RX ADMIN — Medication 500000 UNIT(S): at 20:08

## 2017-01-01 RX ADMIN — SODIUM CHLORIDE 100 MILLILITER(S): 9 INJECTION INTRAMUSCULAR; INTRAVENOUS; SUBCUTANEOUS at 06:25

## 2017-01-01 RX ADMIN — AZITHROMYCIN 250 MILLIGRAM(S): 500 TABLET, FILM COATED ORAL at 22:39

## 2017-01-01 RX ADMIN — Medication 500000 UNIT(S): at 00:56

## 2017-01-01 RX ADMIN — Medication 40 MILLIEQUIVALENT(S): at 05:20

## 2017-01-01 RX ADMIN — Medication 1 TABLET(S): at 18:53

## 2017-01-01 RX ADMIN — CARBIDOPA AND LEVODOPA 1 TABLET(S): 25; 100 TABLET ORAL at 16:34

## 2017-01-01 RX ADMIN — GABAPENTIN 100 MILLIGRAM(S): 400 CAPSULE ORAL at 06:13

## 2017-01-01 RX ADMIN — Medication 100 MILLIEQUIVALENT(S): at 11:01

## 2017-01-01 RX ADMIN — Medication 0.5 MILLIGRAM(S): at 00:20

## 2017-01-01 RX ADMIN — Medication 500000 UNIT(S): at 00:20

## 2017-01-01 RX ADMIN — CARBIDOPA AND LEVODOPA 1 TABLET(S): 25; 100 TABLET ORAL at 05:54

## 2017-01-01 RX ADMIN — Medication 81 MILLIGRAM(S): at 12:56

## 2017-01-01 RX ADMIN — HEPARIN SODIUM 5000 UNIT(S): 5000 INJECTION INTRAVENOUS; SUBCUTANEOUS at 13:06

## 2017-01-01 RX ADMIN — CARBIDOPA AND LEVODOPA 1 TABLET(S): 25; 100 TABLET ORAL at 13:01

## 2017-01-01 RX ADMIN — CARBIDOPA AND LEVODOPA 1 TABLET(S): 25; 100 TABLET ORAL at 13:21

## 2017-01-01 RX ADMIN — SODIUM CHLORIDE 75 MILLILITER(S): 9 INJECTION, SOLUTION INTRAVENOUS at 12:21

## 2017-01-01 RX ADMIN — CARBIDOPA AND LEVODOPA 1 TABLET(S): 25; 100 TABLET ORAL at 13:36

## 2017-01-01 RX ADMIN — CARBIDOPA AND LEVODOPA 1 TABLET(S): 25; 100 TABLET ORAL at 17:16

## 2017-01-01 RX ADMIN — Medication 250 MILLIGRAM(S): at 05:46

## 2017-01-01 RX ADMIN — Medication 250 MILLIGRAM(S): at 05:19

## 2017-01-01 RX ADMIN — CARBIDOPA AND LEVODOPA 1 TABLET(S): 25; 100 TABLET ORAL at 22:38

## 2017-01-01 RX ADMIN — ENOXAPARIN SODIUM 40 MILLIGRAM(S): 100 INJECTION SUBCUTANEOUS at 21:43

## 2017-01-01 RX ADMIN — Medication 250 MILLIGRAM(S): at 19:37

## 2017-01-01 RX ADMIN — POTASSIUM PHOSPHATE, MONOBASIC POTASSIUM PHOSPHATE, DIBASIC 62.5 MILLIMOLE(S): 236; 224 INJECTION, SOLUTION INTRAVENOUS at 12:33

## 2017-01-01 RX ADMIN — Medication 650 MILLIGRAM(S): at 18:48

## 2017-01-01 RX ADMIN — Medication 100 MILLIEQUIVALENT(S): at 01:54

## 2017-01-01 RX ADMIN — CARBIDOPA AND LEVODOPA 1 TABLET(S): 25; 100 TABLET ORAL at 14:19

## 2017-01-01 RX ADMIN — Medication 250 MILLIGRAM(S): at 12:56

## 2017-01-01 RX ADMIN — Medication 500000 UNIT(S): at 13:31

## 2017-01-01 RX ADMIN — Medication 100 MILLIEQUIVALENT(S): at 12:59

## 2017-01-01 RX ADMIN — Medication 250 MILLIGRAM(S): at 17:16

## 2017-01-01 RX ADMIN — MEMANTINE HYDROCHLORIDE 10 MILLIGRAM(S): 10 TABLET ORAL at 18:28

## 2017-01-01 RX ADMIN — Medication 500000 UNIT(S): at 17:42

## 2017-01-01 RX ADMIN — HEPARIN SODIUM 5000 UNIT(S): 5000 INJECTION INTRAVENOUS; SUBCUTANEOUS at 23:10

## 2017-01-01 RX ADMIN — GABAPENTIN 100 MILLIGRAM(S): 400 CAPSULE ORAL at 18:28

## 2017-01-01 RX ADMIN — Medication 500000 UNIT(S): at 18:16

## 2017-01-01 RX ADMIN — PIPERACILLIN AND TAZOBACTAM 25 GRAM(S): 4; .5 INJECTION, POWDER, LYOPHILIZED, FOR SOLUTION INTRAVENOUS at 16:04

## 2017-01-01 RX ADMIN — CARBIDOPA AND LEVODOPA 1 TABLET(S): 25; 100 TABLET ORAL at 13:44

## 2017-01-01 RX ADMIN — Medication 500000 UNIT(S): at 11:39

## 2017-01-01 RX ADMIN — PIPERACILLIN AND TAZOBACTAM 25 GRAM(S): 4; .5 INJECTION, POWDER, LYOPHILIZED, FOR SOLUTION INTRAVENOUS at 22:39

## 2017-01-01 RX ADMIN — CARBIDOPA AND LEVODOPA 1 TABLET(S): 25; 100 TABLET ORAL at 23:35

## 2017-01-01 RX ADMIN — SODIUM CHLORIDE 75 MILLILITER(S): 9 INJECTION, SOLUTION INTRAVENOUS at 21:21

## 2017-01-01 RX ADMIN — Medication 1 TABLET(S): at 05:07

## 2017-01-01 RX ADMIN — Medication 0.5 MILLIGRAM(S): at 21:05

## 2017-01-01 RX ADMIN — PIPERACILLIN AND TAZOBACTAM 200 GRAM(S): 4; .5 INJECTION, POWDER, LYOPHILIZED, FOR SOLUTION INTRAVENOUS at 12:56

## 2017-01-01 RX ADMIN — PROPOFOL 5.4 MICROGRAM(S)/KG/MIN: 10 INJECTION, EMULSION INTRAVENOUS at 20:31

## 2017-01-01 RX ADMIN — Medication 166.67 MILLIGRAM(S): at 06:08

## 2017-01-01 RX ADMIN — CHLORHEXIDINE GLUCONATE 1 APPLICATION(S): 213 SOLUTION TOPICAL at 13:35

## 2017-01-01 RX ADMIN — PIPERACILLIN AND TAZOBACTAM 25 GRAM(S): 4; .5 INJECTION, POWDER, LYOPHILIZED, FOR SOLUTION INTRAVENOUS at 13:44

## 2017-01-01 RX ADMIN — Medication 81 MILLIGRAM(S): at 12:57

## 2017-01-01 RX ADMIN — PIPERACILLIN AND TAZOBACTAM 25 GRAM(S): 4; .5 INJECTION, POWDER, LYOPHILIZED, FOR SOLUTION INTRAVENOUS at 05:22

## 2017-01-01 RX ADMIN — Medication 0.5 MILLIGRAM(S): at 21:51

## 2017-01-01 RX ADMIN — PROPOFOL 5.4 MICROGRAM(S)/KG/MIN: 10 INJECTION, EMULSION INTRAVENOUS at 17:16

## 2017-01-01 RX ADMIN — PIPERACILLIN AND TAZOBACTAM 25 GRAM(S): 4; .5 INJECTION, POWDER, LYOPHILIZED, FOR SOLUTION INTRAVENOUS at 14:03

## 2017-01-01 RX ADMIN — Medication 500000 UNIT(S): at 23:23

## 2017-01-01 RX ADMIN — DIVALPROEX SODIUM 250 MILLIGRAM(S): 500 TABLET, DELAYED RELEASE ORAL at 12:39

## 2017-01-01 RX ADMIN — Medication 0.5 MILLIGRAM(S): at 00:55

## 2017-01-01 RX ADMIN — ENOXAPARIN SODIUM 40 MILLIGRAM(S): 100 INJECTION SUBCUTANEOUS at 21:28

## 2017-01-01 RX ADMIN — SODIUM CHLORIDE 75 MILLILITER(S): 9 INJECTION, SOLUTION INTRAVENOUS at 21:26

## 2017-01-01 RX ADMIN — DIVALPROEX SODIUM 250 MILLIGRAM(S): 500 TABLET, DELAYED RELEASE ORAL at 12:30

## 2017-01-01 RX ADMIN — Medication 250 MILLIGRAM(S): at 18:44

## 2017-01-01 RX ADMIN — CARBIDOPA AND LEVODOPA 1 TABLET(S): 25; 100 TABLET ORAL at 05:21

## 2017-01-01 RX ADMIN — DIVALPROEX SODIUM 250 MILLIGRAM(S): 500 TABLET, DELAYED RELEASE ORAL at 12:42

## 2017-01-01 RX ADMIN — Medication 500000 UNIT(S): at 17:17

## 2017-01-01 RX ADMIN — DIVALPROEX SODIUM 250 MILLIGRAM(S): 500 TABLET, DELAYED RELEASE ORAL at 15:59

## 2017-01-01 RX ADMIN — CARBIDOPA AND LEVODOPA 1 TABLET(S): 25; 100 TABLET ORAL at 13:35

## 2017-01-01 RX ADMIN — Medication 650 MILLIGRAM(S): at 12:33

## 2017-01-01 RX ADMIN — HEPARIN SODIUM 5000 UNIT(S): 5000 INJECTION INTRAVENOUS; SUBCUTANEOUS at 14:59

## 2017-01-01 RX ADMIN — HEPARIN SODIUM 5000 UNIT(S): 5000 INJECTION INTRAVENOUS; SUBCUTANEOUS at 07:01

## 2017-01-01 RX ADMIN — CARBIDOPA AND LEVODOPA 1 TABLET(S): 25; 100 TABLET ORAL at 15:58

## 2017-01-01 RX ADMIN — Medication 100 MILLIEQUIVALENT(S): at 09:30

## 2017-01-01 RX ADMIN — SODIUM CHLORIDE 125 MILLILITER(S): 9 INJECTION, SOLUTION INTRAVENOUS at 22:10

## 2017-01-01 RX ADMIN — Medication 1 TABLET(S): at 21:05

## 2017-01-01 RX ADMIN — PIPERACILLIN AND TAZOBACTAM 25 GRAM(S): 4; .5 INJECTION, POWDER, LYOPHILIZED, FOR SOLUTION INTRAVENOUS at 17:14

## 2017-01-01 RX ADMIN — Medication 500000 UNIT(S): at 23:29

## 2017-01-01 RX ADMIN — HEPARIN SODIUM 5000 UNIT(S): 5000 INJECTION INTRAVENOUS; SUBCUTANEOUS at 21:51

## 2017-01-01 RX ADMIN — CARBIDOPA AND LEVODOPA 1 TABLET(S): 25; 100 TABLET ORAL at 13:04

## 2017-01-01 RX ADMIN — Medication 500000 UNIT(S): at 23:02

## 2017-01-01 RX ADMIN — Medication 650 MILLIGRAM(S): at 23:05

## 2017-01-01 RX ADMIN — Medication 500000 UNIT(S): at 17:01

## 2017-01-01 RX ADMIN — Medication 500000 UNIT(S): at 08:00

## 2017-01-01 RX ADMIN — PIPERACILLIN AND TAZOBACTAM 25 GRAM(S): 4; .5 INJECTION, POWDER, LYOPHILIZED, FOR SOLUTION INTRAVENOUS at 06:08

## 2017-01-01 RX ADMIN — Medication 250 MILLIGRAM(S): at 18:22

## 2017-01-01 RX ADMIN — Medication 100 MILLIEQUIVALENT(S): at 02:39

## 2017-01-01 RX ADMIN — PIPERACILLIN AND TAZOBACTAM 25 GRAM(S): 4; .5 INJECTION, POWDER, LYOPHILIZED, FOR SOLUTION INTRAVENOUS at 05:29

## 2017-01-01 RX ADMIN — Medication 500000 UNIT(S): at 18:53

## 2017-01-01 RX ADMIN — Medication 500000 UNIT(S): at 12:57

## 2017-01-01 RX ADMIN — HEPARIN SODIUM 5000 UNIT(S): 5000 INJECTION INTRAVENOUS; SUBCUTANEOUS at 07:59

## 2017-01-01 RX ADMIN — Medication 81 MILLIGRAM(S): at 12:33

## 2017-01-01 RX ADMIN — Medication 500000 UNIT(S): at 14:20

## 2017-01-01 RX ADMIN — CHLORHEXIDINE GLUCONATE 1 APPLICATION(S): 213 SOLUTION TOPICAL at 11:39

## 2017-01-01 RX ADMIN — SODIUM CHLORIDE 75 MILLILITER(S): 9 INJECTION, SOLUTION INTRAVENOUS at 08:00

## 2017-01-01 RX ADMIN — PIPERACILLIN AND TAZOBACTAM 25 GRAM(S): 4; .5 INJECTION, POWDER, LYOPHILIZED, FOR SOLUTION INTRAVENOUS at 05:53

## 2017-01-01 RX ADMIN — Medication 166.67 MILLIGRAM(S): at 05:29

## 2017-01-01 RX ADMIN — DIVALPROEX SODIUM 250 MILLIGRAM(S): 500 TABLET, DELAYED RELEASE ORAL at 12:34

## 2017-01-01 RX ADMIN — AZITHROMYCIN 250 MILLIGRAM(S): 500 TABLET, FILM COATED ORAL at 18:28

## 2017-01-01 RX ADMIN — CARBIDOPA AND LEVODOPA 1 TABLET(S): 25; 100 TABLET ORAL at 05:07

## 2017-01-01 RX ADMIN — HEPARIN SODIUM 5000 UNIT(S): 5000 INJECTION INTRAVENOUS; SUBCUTANEOUS at 23:05

## 2017-01-01 RX ADMIN — Medication 500000 UNIT(S): at 06:34

## 2017-01-01 RX ADMIN — Medication 500000 UNIT(S): at 13:03

## 2017-01-01 RX ADMIN — Medication 500000 UNIT(S): at 05:54

## 2017-01-01 RX ADMIN — HEPARIN SODIUM 5000 UNIT(S): 5000 INJECTION INTRAVENOUS; SUBCUTANEOUS at 15:58

## 2017-01-01 RX ADMIN — MEMANTINE HYDROCHLORIDE 10 MILLIGRAM(S): 10 TABLET ORAL at 06:13

## 2017-01-01 RX ADMIN — Medication 650 MILLIGRAM(S): at 15:14

## 2017-01-01 RX ADMIN — Medication 250 MILLIGRAM(S): at 21:20

## 2017-01-01 RX ADMIN — Medication 500000 UNIT(S): at 19:10

## 2017-01-01 RX ADMIN — Medication 1 TABLET(S): at 12:29

## 2017-01-01 RX ADMIN — Medication 500000 UNIT(S): at 13:35

## 2017-01-01 RX ADMIN — CARBIDOPA AND LEVODOPA 1 TABLET(S): 25; 100 TABLET ORAL at 05:22

## 2017-01-01 RX ADMIN — Medication 0.5 MILLIGRAM(S): at 21:21

## 2017-01-01 RX ADMIN — Medication 500000 UNIT(S): at 23:15

## 2017-01-01 RX ADMIN — SODIUM CHLORIDE 75 MILLILITER(S): 9 INJECTION, SOLUTION INTRAVENOUS at 07:01

## 2017-01-01 RX ADMIN — Medication 650 MILLIGRAM(S): at 21:41

## 2017-01-01 RX ADMIN — ENOXAPARIN SODIUM 40 MILLIGRAM(S): 100 INJECTION SUBCUTANEOUS at 20:56

## 2017-01-01 RX ADMIN — CHLORHEXIDINE GLUCONATE 1 APPLICATION(S): 213 SOLUTION TOPICAL at 18:46

## 2017-01-01 RX ADMIN — PIPERACILLIN AND TAZOBACTAM 25 GRAM(S): 4; .5 INJECTION, POWDER, LYOPHILIZED, FOR SOLUTION INTRAVENOUS at 13:22

## 2017-01-01 RX ADMIN — Medication 650 MILLIGRAM(S): at 06:40

## 2017-01-01 RX ADMIN — CHLORHEXIDINE GLUCONATE 1 APPLICATION(S): 213 SOLUTION TOPICAL at 12:53

## 2017-01-01 RX ADMIN — Medication 0.5 MILLIGRAM(S): at 22:39

## 2017-01-01 RX ADMIN — Medication 100 MILLIEQUIVALENT(S): at 09:39

## 2017-01-01 RX ADMIN — CHLORHEXIDINE GLUCONATE 1 APPLICATION(S): 213 SOLUTION TOPICAL at 13:29

## 2017-01-01 RX ADMIN — SODIUM CHLORIDE 75 MILLILITER(S): 9 INJECTION INTRAMUSCULAR; INTRAVENOUS; SUBCUTANEOUS at 00:22

## 2017-01-01 RX ADMIN — DONEPEZIL HYDROCHLORIDE 5 MILLIGRAM(S): 10 TABLET, FILM COATED ORAL at 23:09

## 2017-01-01 RX ADMIN — CARBIDOPA AND LEVODOPA 1 TABLET(S): 25; 100 TABLET ORAL at 05:28

## 2017-01-01 RX ADMIN — Medication 500000 UNIT(S): at 05:04

## 2017-01-01 RX ADMIN — Medication 250 MILLIGRAM(S): at 12:20

## 2017-01-01 RX ADMIN — Medication 150 MILLIGRAM(S): at 12:59

## 2017-01-01 RX ADMIN — GABAPENTIN 100 MILLIGRAM(S): 400 CAPSULE ORAL at 13:23

## 2017-01-01 RX ADMIN — HEPARIN SODIUM 5000 UNIT(S): 5000 INJECTION INTRAVENOUS; SUBCUTANEOUS at 13:36

## 2017-01-01 RX ADMIN — HEPARIN SODIUM 5000 UNIT(S): 5000 INJECTION INTRAVENOUS; SUBCUTANEOUS at 15:00

## 2017-01-01 RX ADMIN — CARBIDOPA AND LEVODOPA 1 TABLET(S): 25; 100 TABLET ORAL at 05:04

## 2017-01-01 RX ADMIN — Medication 500000 UNIT(S): at 23:45

## 2017-01-01 RX ADMIN — HEPARIN SODIUM 5000 UNIT(S): 5000 INJECTION INTRAVENOUS; SUBCUTANEOUS at 00:55

## 2017-01-01 RX ADMIN — HEPARIN SODIUM 5000 UNIT(S): 5000 INJECTION INTRAVENOUS; SUBCUTANEOUS at 05:47

## 2017-01-01 RX ADMIN — SODIUM CHLORIDE 1000 MILLILITER(S): 9 INJECTION INTRAMUSCULAR; INTRAVENOUS; SUBCUTANEOUS at 17:06

## 2017-01-01 RX ADMIN — Medication 500000 UNIT(S): at 05:07

## 2017-01-01 RX ADMIN — CARBIDOPA AND LEVODOPA 1 TABLET(S): 25; 100 TABLET ORAL at 00:54

## 2017-01-01 RX ADMIN — HEPARIN SODIUM 5000 UNIT(S): 5000 INJECTION INTRAVENOUS; SUBCUTANEOUS at 14:46

## 2017-01-01 RX ADMIN — Medication 81 MILLIGRAM(S): at 12:10

## 2017-01-01 RX ADMIN — Medication 100 MILLIEQUIVALENT(S): at 00:21

## 2017-01-01 RX ADMIN — Medication 0.5 MILLIGRAM(S): at 23:34

## 2017-01-01 RX ADMIN — SODIUM CHLORIDE 500 MILLILITER(S): 9 INJECTION, SOLUTION INTRAVENOUS at 19:45

## 2017-01-01 RX ADMIN — CARBIDOPA AND LEVODOPA 1 TABLET(S): 25; 100 TABLET ORAL at 05:46

## 2017-01-01 RX ADMIN — Medication 60 MILLIGRAM(S): at 14:37

## 2017-01-01 RX ADMIN — SODIUM CHLORIDE 75 MILLILITER(S): 9 INJECTION, SOLUTION INTRAVENOUS at 06:35

## 2017-01-01 RX ADMIN — SODIUM CHLORIDE 75 MILLILITER(S): 9 INJECTION, SOLUTION INTRAVENOUS at 00:54

## 2017-01-01 RX ADMIN — Medication 81 MILLIGRAM(S): at 15:58

## 2017-01-01 RX ADMIN — PIPERACILLIN AND TAZOBACTAM 25 GRAM(S): 4; .5 INJECTION, POWDER, LYOPHILIZED, FOR SOLUTION INTRAVENOUS at 14:34

## 2017-01-01 RX ADMIN — SODIUM CHLORIDE 100 MILLILITER(S): 9 INJECTION INTRAMUSCULAR; INTRAVENOUS; SUBCUTANEOUS at 21:43

## 2017-01-01 RX ADMIN — GABAPENTIN 100 MILLIGRAM(S): 400 CAPSULE ORAL at 18:25

## 2017-01-01 RX ADMIN — DIVALPROEX SODIUM 250 MILLIGRAM(S): 500 TABLET, DELAYED RELEASE ORAL at 12:56

## 2017-01-01 RX ADMIN — SODIUM CHLORIDE 75 MILLILITER(S): 9 INJECTION INTRAMUSCULAR; INTRAVENOUS; SUBCUTANEOUS at 10:24

## 2017-01-01 RX ADMIN — Medication 81 MILLIGRAM(S): at 12:29

## 2017-01-01 RX ADMIN — Medication 81 MILLIGRAM(S): at 13:02

## 2017-01-01 RX ADMIN — PIPERACILLIN AND TAZOBACTAM 25 GRAM(S): 4; .5 INJECTION, POWDER, LYOPHILIZED, FOR SOLUTION INTRAVENOUS at 05:14

## 2017-01-01 RX ADMIN — CHLORHEXIDINE GLUCONATE 1 APPLICATION(S): 213 SOLUTION TOPICAL at 11:53

## 2017-01-01 RX ADMIN — CARBIDOPA AND LEVODOPA 1 TABLET(S): 25; 100 TABLET ORAL at 06:14

## 2017-01-01 RX ADMIN — Medication 500000 UNIT(S): at 12:39

## 2017-01-01 RX ADMIN — Medication 81 MILLIGRAM(S): at 14:19

## 2017-01-01 RX ADMIN — Medication 100 MILLIEQUIVALENT(S): at 11:38

## 2017-01-01 RX ADMIN — Medication 650 MILLIGRAM(S): at 08:15

## 2017-01-01 RX ADMIN — Medication 250 MILLIGRAM(S): at 21:27

## 2017-01-01 RX ADMIN — Medication 0.5 MILLIGRAM(S): at 21:28

## 2017-01-01 RX ADMIN — Medication 650 MILLIGRAM(S): at 13:21

## 2017-01-01 RX ADMIN — CHLORHEXIDINE GLUCONATE 1 APPLICATION(S): 213 SOLUTION TOPICAL at 13:03

## 2017-01-01 RX ADMIN — Medication 250 MILLIGRAM(S): at 13:55

## 2017-01-01 RX ADMIN — DIVALPROEX SODIUM 250 MILLIGRAM(S): 500 TABLET, DELAYED RELEASE ORAL at 12:10

## 2017-01-01 RX ADMIN — Medication 250 MILLIGRAM(S): at 05:51

## 2017-01-01 RX ADMIN — GABAPENTIN 100 MILLIGRAM(S): 400 CAPSULE ORAL at 05:20

## 2017-01-01 RX ADMIN — SODIUM CHLORIDE 75 MILLILITER(S): 9 INJECTION, SOLUTION INTRAVENOUS at 13:02

## 2017-01-01 RX ADMIN — SODIUM CHLORIDE 75 MILLILITER(S): 9 INJECTION, SOLUTION INTRAVENOUS at 05:53

## 2017-01-01 RX ADMIN — Medication 1 TABLET(S): at 23:23

## 2017-01-01 RX ADMIN — Medication 500000 UNIT(S): at 11:53

## 2017-01-01 RX ADMIN — Medication 650 MILLIGRAM(S): at 06:34

## 2017-01-01 RX ADMIN — CHLORHEXIDINE GLUCONATE 1 APPLICATION(S): 213 SOLUTION TOPICAL at 12:57

## 2017-01-01 RX ADMIN — PIPERACILLIN AND TAZOBACTAM 25 GRAM(S): 4; .5 INJECTION, POWDER, LYOPHILIZED, FOR SOLUTION INTRAVENOUS at 07:01

## 2017-01-01 RX ADMIN — CARBIDOPA AND LEVODOPA 1 TABLET(S): 25; 100 TABLET ORAL at 23:05

## 2017-01-01 RX ADMIN — DIVALPROEX SODIUM 250 MILLIGRAM(S): 500 TABLET, DELAYED RELEASE ORAL at 13:03

## 2017-01-01 RX ADMIN — CARBIDOPA AND LEVODOPA 1 TABLET(S): 25; 100 TABLET ORAL at 21:06

## 2017-01-01 RX ADMIN — Medication 500000 UNIT(S): at 23:18

## 2017-02-19 NOTE — ED PROVIDER NOTE - OBJECTIVE STATEMENT
Attending  72yo M MADALYN from [facility] pw resp distress. EMS called for fever and sob. EMS found pt pulse ox 80%, unresponsive, unknown baseline, Attending  70yo M BIBEMS from [facility] pw resp distress. EMS called for fever and sob. EMS found pt pulse ox 80%, unresponsive, unknown baseline, bagged pt en route. Pulse ox increased to 90%  pt unresponsive, tachycardic, hypoxic  NO advance directives in documents from [facility]

## 2017-02-19 NOTE — H&P ADULT. - PROBLEM SELECTOR PLAN 1
Source likely PNA and UTI. Given 1.5L NS in ED.  holding antihypertensive meds for now  trend lactate  VS q1 Temp q4 Source likely PNA and UTI. Given 1.5L NS in ED.  holding antihypertensive meds for now  c/w NS @100ml/hr   trend lactate  VS q1 Temp q4

## 2017-02-19 NOTE — ED PROVIDER NOTE - PROGRESS NOTE DETAILS
[place] PGY3: Called MICU for consult, asked to call back in 5 minutes DW radiology attending cxr read unable to evaluate for a pneumothorax on the left side. cant definitely see a pleural edge. recommend a ct chest. spoke with ct tech to take pt to ct

## 2017-02-19 NOTE — H&P ADULT. - ASSESSMENT
71 year old male hx of schizophrenia parkinson's, htn, CAD, BPH brought in by EMS for fever and SOB, found to be AMS (unknown baseline), septic, hypoxic respiratory distress requiring intubation and admitting to MICU with + UA and abnormal CXR likely 2/2 PNA and UTI. 71 year old male hx of schizophrenia parkinson's, htn, CAD, BPH brought in by EMS for fever and SOB, found to be AMS (unknown baseline), septic, hypoxic respiratory distress requiring intubation and admitting to MICU with + UA and abnormal CXR likely 2/2 PNA and UTI, questionable pneumothorax on CXR.

## 2017-02-19 NOTE — PATIENT PROFILE ADULT. - FUNCTIONAL SCREEN CURRENT LEVEL: COMMUNICATION, MLM
(3) unable to understand or speak (not related to language barrier)/intubated on propofol (3) unable to understand or speak (not related to language barrier)

## 2017-02-19 NOTE — H&P ADULT. - PROBLEM SELECTOR PLAN 6
EKG sinus tach  c/w home aspirin  will get cardiac enzymes EKG sinus tach  c/w home aspirin  Cardiac enzymes slightly elevated will repeat x3.

## 2017-02-19 NOTE — H&P ADULT. - PROBLEM SELECTOR PLAN 3
Abnormal  CXR, hypoxia requiring Intubation. CT chest pending   -start Vanc/zosyn/ azithromycin  -send culture from ET tube specimen.  -send urine legionella Abnormal  CXR, hypoxia requiring Intubation.   -start Vanc/zosyn/ azithromycin  -send culture from ET tube specimen.  -send urine legionella

## 2017-02-19 NOTE — H&P ADULT. - PMH
Behavioral disorder    BPH (benign prostatic hyperplasia)    CAD (coronary artery disease)    Dementia    Hypertension    Parkinson disease    Schizophrenia

## 2017-02-19 NOTE — H&P ADULT. - HISTORY OF PRESENT ILLNESS
71 year old male from [facility] hx of schizophrenia parkinson's, htn, BPH brought in by EMS for AMS and found to be in respiratory distress and hypoxic bag masked resulting in o2sat in 90's. in ED 71 year old male from [facility] hx of schizophrenia parkinson's, htn, CAD, BPH brought in by EMS for fever and SOB, found to be AMS (unknown baseline)and hypoxic in the 80's bag masked enroute resulting in o2sat in 90's. In ED CXR showed Ill-defined patchy left retrocardiac opacity and ill-defined increased right basilar lung markings and opacity raise suspicion for infiltrate/pneumonia. Temp 103.6, tachycardic, tachypneic, mild leukocytosis. EKG showed sinus tach. UA indicated UTI. given one dose vancomycin, cipro, cefepime. Increase in hypoxia required emergent intubation and admit to MICU. 71 year old male from [facility] hx of schizophrenia parkinson's, htn, CAD, BPH brought in by EMS for fever and SOB, found to be AMS (unknown baseline)and hypoxic in the 80's bag masked enroute resulting in o2sat in 90's. In ED CXR showed Ill-defined patchy left retrocardiac opacity and ill-defined increased right basilar lung markings and opacity raise suspicion for infiltrate/pneumonia and questionable pneumothorax. Temp 103.6, tachycardic, tachypneic, mild leukocytosis. EKG showed sinus tach. UA indicated UTI. given one dose vancomycin, cipro, cefepime. Increase in hypoxia required emergent intubation and admit to MICU.

## 2017-02-19 NOTE — H&P ADULT. - PROBLEM SELECTOR PLAN 2
Requiring intubation  -vent settings 18/500/5/50  -will get ABG s/p intubation  -OG tube  -CXR to verify placement of tubes. can start feeds when OG tube is verified. Requiring intubation  -CT chest pending r/o pneuomothorax  -vent settings 18/500/5/50  -will get ABG s/p intubation  -OG tube  -CXR to verify placement of tubes. can start feeds when OG tube is verified.

## 2017-02-19 NOTE — ED ADULT NURSE REASSESSMENT NOTE - NS ED NURSE REASSESS COMMENT FT1
Pt. taken to CT scan accompanied by float RN, respiratory tech, MD for chest CT. Pt. not sedated, moving, Propofol restarted as ordered by MD.

## 2017-02-19 NOTE — PATIENT PROFILE ADULT. - VISION (WITH CORRECTIVE LENSES IF THE PATIENT USUALLY WEARS THEM):
ALEK Partially impaired: cannot see medication labels or newsprint, but can see obstacles in path, and the surrounding layout; can count fingers at arm's length/as per sister wears glasses

## 2017-02-19 NOTE — ED PROVIDER NOTE - MEDICAL DECISION MAKING DETAILS
respiratory distress, unresponsive. pt hypoxic require intubation.   labs, cxr, ekg, ua, rvp, ivf, ivabx, admission

## 2017-02-19 NOTE — ED PROCEDURE NOTE - DETAILS:
Attending Statement: I have personally seen and examined this patient. I have fully participated in the care of this patient. I have reviewed all pertinent clinical information, including history physical exam, plan and the Resident's note and agree except as noted

## 2017-02-19 NOTE — ED ADULT TRIAGE NOTE - CHIEF COMPLAINT QUOTE
from [facility]. home resp distress. arrives ems ,pt bagged on arrival. fs 174. reported by ems pulse ox 80% ono2. reported fever,rhonchi by cheryl

## 2017-02-19 NOTE — PATIENT PROFILE ADULT. - NS PRO CONTRA FLU 1
unable to assess immunization status no pt non verbal, sister does not know/unable to assess immunization status

## 2017-02-19 NOTE — ED ADULT NURSE NOTE - CHIEF COMPLAINT QUOTE
from [facility]. home resp distress. arrives ems ,pt bagged on arrival. fs 174. reported by ems pulse ox 80% ono2. reported fever,rhonchi by cheryl

## 2017-02-19 NOTE — ED PROVIDER NOTE - PMH
Behavioral disorder    BPH (benign prostatic hyperplasia)    CAD (coronary artery disease)    Dementia    Hypertension

## 2017-02-22 NOTE — DIETITIAN INITIAL EVALUATION ADULT. - PROBLEM SELECTOR PLAN 3
Abnormal  CXR, hypoxia requiring Intubation.   -start Vanc/zosyn/ azithromycin  -send culture from ET tube specimen.  -send urine legionella

## 2017-02-22 NOTE — SWALLOW BEDSIDE ASSESSMENT ADULT - DIET PRIOR TO ADMI
Unknown at this time. Information not posted in chart and family not present at bedside. Pt. remained nonverbal throughout the evaluation.

## 2017-02-22 NOTE — SWALLOW BEDSIDE ASSESSMENT ADULT - ASR SWALLOW ASPIRATION MONITOR
upper respiratory infection/gurgly voice/pneumonia/oral hygiene/throat clearing/change of breathing pattern/position upright (90Y)/fever/cough

## 2017-02-22 NOTE — DIETITIAN INITIAL EVALUATION ADULT. - PROBLEM SELECTOR PLAN 2
Requiring intubation  -CT chest pending r/o pneuomothorax  -vent settings 18/500/5/50  -will get ABG s/p intubation  -OG tube  -CXR to verify placement of tubes. can start feeds when OG tube is verified.

## 2017-02-22 NOTE — DIETITIAN INITIAL EVALUATION ADULT. - NS AS NUTRI INTERV COLLABORAT
Collaboration with other providers/1) NPO pending VFSS/MBS for further recommendations    2) PO diet if feasible- per SLP recommendations    3) If PO is contraindicated and alternate means of nutrition support is consistent with goals of care, please reconsult Registered Dietitian for further recommendations   4) Although unable to meet criteria for diagnosis of malnutrition at this time due to limited history obtained, patient remains at risk for malnutrition and dietitian remains available for further recommendations as needed

## 2017-02-22 NOTE — PHYSICAL THERAPY INITIAL EVALUATION ADULT - PERTINENT HX OF CURRENT PROBLEM, REHAB EVAL
71 year old male from [facility] hx of schizophrenia parkinson's, htn, CAD, BPH brought in by EMS for fever and SOB, found to be AMS (unknown baseline)and hypoxic in the 80's bag masked enroute resulting in o2sat in 90's. In ED CXR showed Ill-defined patchy left retrocardiac opacity and ill-defined increased right basilar lung markings and opacity raise suspicion for infiltrate/pneumonia and questionable pneumothorax.

## 2017-02-22 NOTE — DIETITIAN INITIAL EVALUATION ADULT. - PROBLEM SELECTOR PLAN 1
Source likely PNA and UTI. Given 1.5L NS in ED.  holding antihypertensive meds for now  c/w NS @100ml/hr   trend lactate  VS q1 Temp q4

## 2017-02-22 NOTE — DIETITIAN INITIAL EVALUATION ADULT. - FACTORS AFF FOOD INTAKE
other (specify)/difficulty feeding self/Parkinson's Disease, Dementia, Schizophrenia/difficulty chewing/difficulty swallowing

## 2017-02-22 NOTE — DIETITIAN INITIAL EVALUATION ADULT. - OTHER INFO
Nutrition consult received for enteral evaluation; admitted from [facility] for AMS. S/p SLP bedside eval on 2/22 and PO intake is contraindicated, now NPO pending VFSS/MBS to further assess feasibility of PO diet. No GI distress (nausea/vomiting/diarrhea/constipation) or food allergies otherwise noted. Unable to obtain any further diet history at this time.

## 2017-02-22 NOTE — SWALLOW BEDSIDE ASSESSMENT ADULT - SLP PERTINENT HISTORY OF CURRENT PROBLEM
71 year old male with PMH: schizophrenia, parkinson's, HTN, CAD, BPH brought in by EMS for fever and SOB, found to have AMS (unknown baseline), septic,  hypoxic respiratory failure, increasesd work of breathing, requiring intubation and admitted to MICU with + UA and abnormal CXR likley 2/2 PNA and UTI. Pt. intubated upon admission and extubated on 2/21/17.

## 2017-02-22 NOTE — SWALLOW BEDSIDE ASSESSMENT ADULT - COMMENTS
Pt. familiar to this department from previous attempt to complete a swallow evaluation on 2/21/17. Please see report for details. The pt. was received in bed awake with supplemental oxygen in place via nasal cannula. The pt. remained nonverbal throughout the evaluation and minimal eye contact made. The pt. was positioned upright and presented with trials of puree demonstrating adequate oral containment and manipulation with timely A-P transit of the bolus. Moderate pharyngeal dysphagia with trials of puree marked by timely swallows with adequate laryngeal elevation upon palpation with a delayed congested cough upon completion of trials. The pt.'s medical team was present at bedside upon completion of the swallow evaluation. Reviewed recommendation for temporary non oral means of nutrition, hydration and medication at this time as oral nutrition is contraindicated pending results of MBS. Reviewed results and recommendations with the pt.'s nurse, Danita as well.

## 2017-02-22 NOTE — SWALLOW BEDSIDE ASSESSMENT ADULT - SWALLOW EVAL: RECOMMENDED DIET
Oral nutrition is contraindicated at this time. Consider temporary non oral means of nutrition, hydration and medication pending results of MBS.

## 2017-02-23 NOTE — SWALLOW VFSS/MBS ASSESSMENT ADULT - COMMENTS
The patient was received in the radiology suite this AM, at which time he was alert though intermittently responsive to verbal stimulation. The patient remained nonverbal throughout this evaluation. The patient is known to this department as he was seen for a clinical bedside swallow evaluation on 2/22/17 (please see full report for details), at which time oral nutrition/hydration was contraindicated and an MBS was recommended. It should be noted that the patient exhibited baseline coughing prior to administration of any PO trials. Discussed results and recommendations from this evaluation with Team 3.

## 2017-02-23 NOTE — DISCHARGE NOTE ADULT - PROVIDER TOKENS
FREE:[LAST:[[facility]],PHONE:[(   )    -],FAX:[(   [phone]    -],ADDRESS:[[address] [place] [address]  Phone: [phone]  Fax: (       -.]]

## 2017-02-23 NOTE — DISCHARGE NOTE ADULT - SECONDARY DIAGNOSIS.
Behavioral disorder BPH (benign prostatic hyperplasia) CAD (coronary artery disease) Hypertension Parkinson disease

## 2017-02-23 NOTE — DISCHARGE NOTE ADULT - VISION (WITH CORRECTIVE LENSES IF THE PATIENT USUALLY WEARS THEM):
as per sister wears glasses/Partially impaired: cannot see medication labels or newsprint, but can see obstacles in path, and the surrounding layout; can count fingers at arm's length

## 2017-02-23 NOTE — SWALLOW VFSS/MBS ASSESSMENT ADULT - NS SWALLOW VFSS REC ASPIR MON
throat clearing/upper respiratory infection/gurgly voice/oral hygiene/position upright (90Y)/fever/change of breathing pattern/cough/pneumonia

## 2017-02-23 NOTE — DISCHARGE NOTE ADULT - HOSPITAL COURSE
71 year old male from [facility] hx of schizophrenia parkinson's, htn, CAD, BPH brought in by EMS for fever and SOB, found to be AMS (unknown baseline)and hypoxic in the 80's bag masked enroute resulting in o2sat in 90's. In ED CXR showed Ill-defined patchy left retrocardiac opacity and ill-defined increased right basilar lung markings and opacity raise suspicion for infiltrate/pneumonia and questionable pneumothorax. Temp 103.6, tachycardic, tachypneic, mild leukocytosis. EKG showed sinus tach. UA indicated UTI. given one dose vancomycin, cipro, cefepime. Increase in hypoxia required emergent intubation and admit to MICU.     Patient s/p MICU course for intubation and subsequently transferred to general medicine floors. MRSA multifocal PNA was covered with vancomycin and ID following. Primary team spoke with patient's family regarding goals of care for which patient was made DNR/DNI. Pt failed cinesophagram and family opted for pleasure feeds w/ dysphagia 1 diet. 71 year old male from [facility] hx of schizophrenia parkinson's, htn, CAD, BPH brought in by EMS for fever and SOB, found to be AMS (unknown baseline)and hypoxic in the 80's bag masked enroute resulting in o2sat in 90's. In ED CXR showed Ill-defined patchy left retrocardiac opacity and ill-defined increased right basilar lung markings and opacity raise suspicion for infiltrate/pneumonia and questionable pneumothorax. Temp 103.6, tachycardic, tachypneic, mild leukocytosis. EKG showed sinus tach. UA indicated UTI. given one dose vancomycin, cipro, cefepime. Increase in hypoxia required emergent intubation and admit to MICU.     Patient s/p MICU course for intubation and subsequently transferred to general medicine floors. MRSA multifocal PNA was covered with vancomycin and ID following. Primary team spoke with patient's family regarding goals of care for which patient was made DNR/DNI. Pt failed cinesophagram and family opted for pleasure feeds w/ dysphagia 1 diet.     Patient and family discussed hospice care and patient will be amenable to it at [facility]. 71 year old male from [facility] hx of schizophrenia parkinson's, htn, CAD, BPH brought in by EMS for fever and SOB, found to be AMS (unknown baseline)and hypoxic in the 80's bag masked enroute resulting in o2sat in 90's. In ED CXR showed Ill-defined patchy left retrocardiac opacity and ill-defined increased right basilar lung markings and opacity raise suspicion for infiltrate/pneumonia and questionable pneumothorax. Temp 103.6, tachycardic, tachypneic, mild leukocytosis. EKG showed sinus tach. UA indicated UTI. given one dose vancomycin, cipro, cefepime. Increase in hypoxia required emergent intubation and admit to MICU.     Patient s/p MICU course for intubation and subsequently transferred to general medicine floors. MRSA multifocal PNA was covered with vancomycin and ID following. Primary team spoke with patient's family regarding goals of care for which patient was made DNR/DNI. Pt failed cinesophagram and family opted for pleasure feeds w/ dysphagia 1 diet. Patient was continued on vancomycin by level given MATEUSZ 2/2 vancomycin with discharge on bactrim to complete a 3 week course of abx for MRSA PNA. S/p 7d course of zosyn for aspiration PNA.     Patient medically stable upon discharge. Patient and family discussed hospice care and patient will be amenable to it at [facility]. 71 year old male from [facility] hx of schizophrenia parkinson's, htn, CAD, BPH brought in by EMS for fever and SOB, found to be AMS (unknown baseline)and hypoxic in the 80's bag masked enroute resulting in o2sat in 90's. In ED CXR showed Ill-defined patchy left retrocardiac opacity and ill-defined increased right basilar lung markings and opacity raise suspicion for infiltrate/pneumonia and questionable pneumothorax. Temp 103.6, tachycardic, tachypneic, mild leukocytosis. EKG showed sinus tach. UA indicated UTI. given one dose vancomycin, cipro, cefepime. Increase in hypoxia required emergent intubation and admit to MICU.     Patient s/p MICU course for intubation and subsequently transferred to general medicine floors. MRSA multifocal PNA was covered with vancomycin and ID following. Primary team spoke with patient's family regarding goals of care for which patient was made DNR/DNI. Pt failed cinesophagram and family opted for pleasure feeds w/ dysphagia 1 diet. Patient was continued on vancomycin by level given MATEUSZ 2/2 vancomycin with discharge on bactrim to complete a 3 week course of abx for MRSA PNA. Patient spiked fevers s/p zosyn x 7 days for which it was re-instated given likely aspiration with pleasure feeds. Patient's family opted for pleasure feeds understanding the risk of aspiration.    Patient medically stable upon discharge. Patient and family discussed hospice care and patient will be amenable to it at [facility]. 71 year old male from [facility] hx of schizophrenia parkinson's, htn, CAD, BPH brought in by EMS for fever and SOB, found to be AMS (unknown baseline)and hypoxic in the 80's bag masked enroute resulting in o2sat in 90's. In ED CXR showed Ill-defined patchy left retrocardiac opacity and ill-defined increased right basilar lung markings and opacity raise suspicion for infiltrate/pneumonia and questionable pneumothorax. Temp 103.6, tachycardic, tachypneic, mild leukocytosis. EKG showed sinus tach. UA indicated UTI. given one dose vancomycin, cipro, cefepime. Increase in hypoxia required emergent intubation and admit to MICU.     Patient s/p MICU course for intubation and subsequently transferred to general medicine floors. MRSA multifocal PNA was covered with vancomycin and ID following. Primary team spoke with patient's family regarding goals of care for which patient was made DNR/DNI. Pt failed cinesophagram and family opted for pleasure feeds w/ dysphagia 1 diet. Patient was continued on vancomycin by level given MATEUSZ 2/2 vancomycin with discharge on bactrim to complete a 3 week course of abx for MRSA PNA. Patient spiked fevers s/p zosyn x 7 days for which it was re-instated given likely aspiration with pleasure feeds. Patient's family opted for pleasure feeds understanding the risk of aspiration.    Patient medically stable upon discharge. Patient and family discussed hospice care and will be amenable to it at [facility]. 71 year old male from [facility] hx of schizophrenia parkinson's, htn, CAD, BPH brought in by EMS for fever and SOB, found to be AMS (unknown baseline)and hypoxic in the 80's bag masked enroute resulting in o2sat in 90's. In ED CXR showed Ill-defined patchy left retrocardiac opacity and ill-defined increased right basilar lung markings and opacity raise suspicion for infiltrate/pneumonia and questionable pneumothorax. Temp 103.6, tachycardic, tachypneic, mild leukocytosis. EKG showed sinus tach. UA indicated UTI. given one dose vancomycin, cipro, cefepime. Increase in hypoxia required emergent intubation and admit to MICU.     Patient s/p MICU course for intubation and subsequently transferred to general medicine floors. MRSA multifocal PNA was covered with vancomycin and ID following. Primary team spoke with patient's family regarding goals of care for which patient was made DNR/DNI. Pt failed cinesophagram and family opted for pleasure feeds w/ dysphagia 1 diet. Patient was continued on vancomycin by level given MATEUSZ 2/2 vancomycin with discharged on bactrim to complete a 3 week course of abx for MRSA PNA. Patient spiked fevers s/p zosyn x 7 days for which it was re-instated given likely aspiration with pleasure feeds. Patient's family opted for pleasure feeds understanding the risk of aspiration. Patient sent with MOLST form to complete with PCP.     Patient medically stable upon discharge. Patient and family discussed hospice care and will be amenable to it at [facility]. 71 year old male from [facility] hx of schizophrenia parkinson's, htn, CAD, BPH brought in by EMS for fever and SOB, found to be AMS (unknown baseline)and hypoxic in the 80's bag masked enroute resulting in o2sat in 90's. In ED CXR showed Ill-defined patchy left retrocardiac opacity and ill-defined increased right basilar lung markings and opacity raise suspicion for infiltrate/pneumonia and questionable pneumothorax. Temp 103.6, tachycardic, tachypneic, mild leukocytosis. EKG showed sinus tach. UA indicated UTI. given one dose vancomycin, cipro, cefepime. Increase in hypoxia required emergent intubation and admit to MICU.     Patient s/p MICU course for intubation and subsequently transferred to general medicine floors. MRSA multifocal PNA was covered with vancomycin and ID following. Primary team spoke with patient's family regarding goals of care for which patient was made DNR/DNI. Pt failed cinesophagram and family opted for pleasure feeds w/ dysphagia 1 diet. Patient was continued on vancomycin by level given MATEUSZ 2/2 vancomycin and was discharged with bactrim to complete a 3 week course of abx for MRSA PNA. Patient spiked fevers s/p zosyn x 7 days for which it was re-instated given likely aspiration with pleasure feeds. Patient's family opted for pleasure feeds understanding the risk of aspiration. Patient sent to [facility] with MOLST form to complete with PCP.     Patient medically stable upon discharge. Patient and family discussed hospice care and will be amenable to it at [facility]. 71 year old male from [facility] hx of schizophrenia parkinson's, htn, CAD, BPH brought in by EMS for fever and SOB, found to be AMS (unknown baseline)and hypoxic in the 80's bag masked enroute resulting in o2sat in 90's. In ED CXR showed Ill-defined patchy left retrocardiac opacity and ill-defined increased right basilar lung markings and opacity raise suspicion for infiltrate/pneumonia and questionable pneumothorax. Temp 103.6, tachycardic, tachypneic, mild leukocytosis. EKG showed sinus tach. UA indicated UTI. given one dose vancomycin, cipro, cefepime. Increase in hypoxia required emergent intubation and admit to MICU.     Patient s/p MICU course for intubation and subsequently transferred to general medicine floors. MRSA multifocal PNA was covered with vancomycin and ID following. Primary team spoke with patient's family regarding goals of care for which patient was made DNR/DNI. Pt failed cinesophagram and family opted for pleasure feeds w/ dysphagia 1 diet. Patient was continued on vancomycin by level given MATEUSZ 2/2 vancomycin and was discharged with bactrim to complete a 3 week course of abx for MRSA PNA. Patient spiked fevers s/p zosyn x 7 days for which it was re-instated given likely aspiration with pleasure feeds. Patient's family opted for pleasure feeds understanding the risk of aspiration. Patient sent to [facility] with MOLST form to complete with PCP.     A Rx for Bactrim and Augmentin was provided to include in patient's records upon discharge.    Patient medically stable upon discharge. Patient and family discussed hospice care and will be amenable to it at [facility].

## 2017-02-23 NOTE — DISCHARGE NOTE ADULT - CARE PLAN
Principal Discharge DX:	Respiratory failure with hypoxia  Goal:	Resolution  Instructions for follow-up, activity and diet:	Please follow-up with your PCP within 1 week after discharge. Please continue with Bactrim for a 3 week total course of antibiotics for MRSA PNA.  Secondary Diagnosis:	Behavioral disorder  Instructions for follow-up, activity and diet:	Please continue with lorazapam 0.5mg at bedtime and depakote 250 daily and f/u w/ your PCP within 1-2 weeks after discharge.  Secondary Diagnosis:	BPH (benign prostatic hyperplasia)  Instructions for follow-up, activity and diet:	Please continue with your medications as instructed and f/u with your PCP.  Secondary Diagnosis:	CAD (coronary artery disease)  Instructions for follow-up, activity and diet:	Please f/u with your cardiologist within 2-4 weeks after d/c.  Secondary Diagnosis:	Hypertension  Instructions for follow-up, activity and diet:	Please c/w your anti-HTN medications and f/u with your PCP.  Secondary Diagnosis:	Parkinson disease  Instructions for follow-up, activity and diet:	Please c/w Sinemet and memantine and f/u with your PCP within 1-2 weeks after d/c. Principal Discharge DX:	Respiratory failure with hypoxia  Goal:	Resolution  Instructions for follow-up, activity and diet:	Please follow-up with your PCP within 1 week after discharge. Please continue with Bactrim for a 3 week total course of antibiotics for MRSA PNA.  Secondary Diagnosis:	Behavioral disorder  Instructions for follow-up, activity and diet:	Please continue with lorazapam 0.5mg at bedtime and depakote 250 daily and f/u w/ your PCP within 1-2 weeks after discharge.  Secondary Diagnosis:	BPH (benign prostatic hyperplasia)  Instructions for follow-up, activity and diet:	Please continue with your anti-BPH medications as instructed and f/u with your PCP.  Secondary Diagnosis:	CAD (coronary artery disease)  Instructions for follow-up, activity and diet:	Please f/u with your cardiologist within 2-4 weeks after d/c.  Secondary Diagnosis:	Hypertension  Instructions for follow-up, activity and diet:	Please c/w your anti-HTN medications and f/u with your PCP.  Secondary Diagnosis:	Parkinson disease  Instructions for follow-up, activity and diet:	Please c/w Sinemet and memantine and f/u with your PCP within 1-2 weeks after d/c. Principal Discharge DX:	Respiratory failure with hypoxia  Goal:	Resolution  Instructions for follow-up, activity and diet:	Please follow-up with your PCP within 1 week after discharge. Please continue with Bactrim for a 3 week total course of antibiotics for MRSA PNA. Please continue with doxycycline x 8 days post-discharge to complete a 3 week course of antibiotics for aspiration PNA.  Secondary Diagnosis:	Behavioral disorder  Instructions for follow-up, activity and diet:	Please continue with lorazapam 0.5mg at bedtime and depakote 250 daily and f/u w/ your PCP within 1-2 weeks after discharge.  Secondary Diagnosis:	BPH (benign prostatic hyperplasia)  Instructions for follow-up, activity and diet:	Please continue with your anti-BPH medications as instructed and f/u with your PCP.  Secondary Diagnosis:	CAD (coronary artery disease)  Instructions for follow-up, activity and diet:	Please f/u with your cardiologist within 2-4 weeks after d/c.  Secondary Diagnosis:	Hypertension  Instructions for follow-up, activity and diet:	Please c/w your anti-HTN medications and f/u with your PCP.  Secondary Diagnosis:	Parkinson disease  Instructions for follow-up, activity and diet:	Please c/w Sinemet and memantine and f/u with your PCP within 1-2 weeks after d/c. Principal Discharge DX:	Respiratory failure with hypoxia  Goal:	Resolution  Instructions for follow-up, activity and diet:	Please follow-up with your PCP within 1 week after discharge. Please continue with Bactrim for a 3 week total course of antibiotics for MRSA PNA. Please continue with augmentin x 8 days post-discharge to complete a 3 week course of antibiotics for aspiration PNA.  Secondary Diagnosis:	Behavioral disorder  Instructions for follow-up, activity and diet:	Please continue with lorazapam 0.5mg at bedtime and depakote 250 daily and f/u w/ your PCP within 1-2 weeks after discharge.  Secondary Diagnosis:	BPH (benign prostatic hyperplasia)  Instructions for follow-up, activity and diet:	Please continue with your anti-BPH medications as instructed and f/u with your PCP.  Secondary Diagnosis:	CAD (coronary artery disease)  Instructions for follow-up, activity and diet:	Please f/u with your cardiologist within 2-4 weeks after d/c.  Secondary Diagnosis:	Hypertension  Instructions for follow-up, activity and diet:	Please c/w your anti-HTN medications and f/u with your PCP.  Secondary Diagnosis:	Parkinson disease  Instructions for follow-up, activity and diet:	Please c/w Sinemet and memantine and f/u with your PCP within 1-2 weeks after d/c.

## 2017-02-23 NOTE — SWALLOW VFSS/MBS ASSESSMENT ADULT - ROSENBEK'S PENETRATION ASPIRATION SCALE
(3) contrast remains above the vocal cords, visible residue remains (penetration) (8) contrast passes glottis, visible subglottic residue remains, absent patient response (aspiration)

## 2017-02-23 NOTE — DISCHARGE NOTE ADULT - PLAN OF CARE
Resolution Please follow-up with your PCP within 1 week after discharge. Please continue with Bactrim for a 3 week total course of antibiotics for MRSA PNA. Please continue with lorazapam 0.5mg at bedtime and depakote 250 daily and f/u w/ your PCP within 1-2 weeks after discharge. Please continue with your medications as instructed and f/u with your PCP. Please f/u with your cardiologist within 2-4 weeks after d/c. Please c/w your anti-HTN medications and f/u with your PCP. Please c/w Sinemet and memantine and f/u with your PCP within 1-2 weeks after d/c. Please continue with your anti-BPH medications as instructed and f/u with your PCP. Please follow-up with your PCP within 1 week after discharge. Please continue with Bactrim for a 3 week total course of antibiotics for MRSA PNA. Please continue with doxycycline x 8 days post-discharge to complete a 3 week course of antibiotics for aspiration PNA. Please follow-up with your PCP within 1 week after discharge. Please continue with Bactrim for a 3 week total course of antibiotics for MRSA PNA. Please continue with augmentin x 8 days post-discharge to complete a 3 week course of antibiotics for aspiration PNA.

## 2017-02-23 NOTE — SWALLOW VFSS/MBS ASSESSMENT ADULT - DIAGNOSTIC IMPRESSIONS
1. The patient demonstrated a mild oral dysphagia for puree textures marked by delayed bolus collection, transfer, and posterior transport.   2. The patient demonstrated a moderate oral dysphagia for honey thick and nectar thick liquid textures via tsp and cup presentations marked by delayed bolus collection and transfer with posterior loss to the hypopharynx.   3. The patient demonstrated a moderate-severe pharyngeal dysphagia for puree textures marked by delayed swallow trigger with reduced base of tongue retraction, reduced epiglottic deflection, reduced hyolaryngeal elevation, and incomplete closure of the laryngeal vestibule resulting in silent laryngeal penetration without retrieval. Trace stasis noted on the base of tongue and mild stasis noted in the vallecula which did not reduce despite a liquid wash. Absent cough response suggestive of reduced laryngo-pharyngeal sensation. Patient demonstrated difficulty following multi-step directives to utilize compensatory strategies (i.e. chin tuck, productive cough).   4. The patient demonstrated a severe pharyngeal dysphagia for honey thick and nectar thick liquid textures marked by delayed swallow trigger with reduced base of tongue retraction, reduced epiglottic deflection, reduced hyolaryngeal elevation, and incomplete closure of the laryngeal vestibule resulting in silent aspiration without retrieval. 1. The patient demonstrated a mild oral dysphagia for puree textures marked by delayed bolus collection, transfer, and posterior transport.   2. The patient demonstrated a moderate oral dysphagia for honey thick and nectar thick liquid textures via tsp and cup presentations marked by delayed bolus collection and transfer with posterior loss to the hypopharynx.   3. The patient demonstrated a moderate-severe pharyngeal dysphagia for puree textures marked by delayed swallow trigger with reduced base of tongue retraction, reduced epiglottic deflection, reduced hyolaryngeal elevation, and incomplete closure of the laryngeal vestibule resulting in silent laryngeal penetration without retrieval. Trace stasis noted on the base of tongue and mild stasis noted in the vallecula which did not reduce despite a liquid wash. Absent cough response suggestive of reduced laryngo-pharyngeal sensation. Patient demonstrated difficulty following multi-step directives to utilize compensatory strategies (i.e. chin tuck, productive cough).   4. The patient demonstrated a severe pharyngeal dysphagia for honey thick and nectar thick liquid textures marked by delayed swallow trigger with reduced base of tongue retraction, reduced epiglottic deflection, reduced hyolaryngeal elevation, and incomplete closure of the laryngeal vestibule resulting in silent aspiration during the swallow without retrieval. Absent cough suggestive of reduced laryngo-pharyngeal sensation. Patient again demonstrated difficulty following low-level and multi-step directives, therefore, compensatory strategies (i.e. chin tuck and productive cough) were unsuccessful in protecting the airway.   5. It should be noted that the patient exhibited delayed coughing x2 as the patient was exiting the radiology suite.

## 2017-02-23 NOTE — SWALLOW VFSS/MBS ASSESSMENT ADULT - UNSUCCESSFUL STRATEGIES TRIALED DURING PROCEDURE
productive volitional cough following clinician cue productive volitional cough following clinician cue/chin tuck

## 2017-02-23 NOTE — SWALLOW VFSS/MBS ASSESSMENT ADULT - RECOMMENDED CONSISTENCY
Oral nutrition/hydration is contraindicated. Consider short-term non-oral means of nutrition/hydration. Oral nutrition/hydration is contraindicated. Consider short-term non-oral means of nutrition/hydration given the patient's high risk of aspiration and insufficient nutrition 2/2 his severe oral-pharyngeal phase deficits.

## 2017-02-23 NOTE — DISCHARGE NOTE ADULT - MEDICATION SUMMARY - MEDICATIONS TO TAKE
I will START or STAY ON the medications listed below when I get home from the hospital:    aspirin 81 mg oral tablet  -- 1 tab(s) by mouth once a day  -- Indication: For CAD (coronary artery disease)    tamsulosin 0.4 mg oral capsule  -- 1 cap(s) by mouth once a day  -- Indication: For BPH (benign prostatic hyperplasia)    Flomax 0.4 mg oral capsule  -- 1 cap(s) by mouth once a day  -- Indication: For BPH (benign prostatic hyperplasia)    gabapentin 100 mg oral tablet  -- 1 tab(s) by mouth 3 times a day  -- Indication: For Peripheral neuropathy    LORazepam 0.5 mg oral tablet  -- 1 tab(s) by mouth every 6 hours, As needed, Agitation  -- Indication: For agitation     LORazepam 0.5 mg oral tablet  -- 1 tab(s) by mouth once a day (at bedtime)  -- Indication: For agitation     Depakote 250 mg oral delayed release tablet  -- 1 tab(s) by mouth once a day  -- Indication: For Seizure ppx     Sinemet 25 mg-100 mg oral tablet  -- 1 tab(s) by mouth 3 times a day  -- Indication: For Parkinson disease    metoprolol 25 mg oral tablet  -- 1 tab(s) by mouth once a day  -- Indication: For HTN    donepezil 5 mg oral tablet  -- 1 tab(s) by mouth once a day (at bedtime)  -- Indication: For dementia    Bactrim  mg-160 mg oral tablet  -- 1 tab(s) by mouth 2 times a day MDD:two tablets daily  -- Avoid prolonged or excessive exposure to direct and/or artificial sunlight while taking this medication.  Finish all this medication unless otherwise directed by prescriber.  Medication should be taken with plenty of water.    -- Indication: For MRSA PNA    memantine 10 mg oral tablet  -- 1 tab(s) by mouth 2 times a day  -- Indication: For dementia    Namenda 10 mg oral tablet  -- 1 tab(s) by mouth 2 times a day  -- Indication: For dementia    Augmentin 500 mg-125 mg oral tablet  -- 1 milligram(s) by mouth 2 times a day MDD:two tablets daily  -- Finish all this medication unless otherwise directed by prescriber.  Take with food or milk.    -- Indication: For aspiration PNA I will START or STAY ON the medications listed below when I get home from the hospital:    aspirin 81 mg oral tablet  -- 1 tab(s) by mouth once a day  -- Indication: For CAD (coronary artery disease)    tamsulosin 0.4 mg oral capsule  -- 1 cap(s) by mouth once a day  -- Indication: For BPH (benign prostatic hyperplasia)    Flomax 0.4 mg oral capsule  -- 1 cap(s) by mouth once a day  -- Indication: For BPH (benign prostatic hyperplasia)    gabapentin 100 mg oral tablet  -- 1 tab(s) by mouth 3 times a day  -- Indication: For Peripheral neuropathy    LORazepam 0.5 mg oral tablet  -- 1 tab(s) by mouth every 6 hours, As needed, Agitation  -- Indication: For agitation     LORazepam 0.5 mg oral tablet  -- 1 tab(s) by mouth once a day (at bedtime)  -- Indication: For agitation     Depakote 250 mg oral delayed release tablet  -- 1 tab(s) by mouth once a day  -- Indication: For Seizure ppx     Sinemet 25 mg-100 mg oral tablet  -- 1 tab(s) by mouth 3 times a day  -- Indication: For Parkinson disease    metoprolol 25 mg oral tablet  -- 1 tab(s) by mouth once a day  -- Indication: For HTN    donepezil 5 mg oral tablet  -- 1 tab(s) by mouth once a day (at bedtime)  -- Indication: For dementia    Bactrim  mg-160 mg oral tablet  -- 1 tab(s) by mouth 2 times a day MDD:two tablets daily  -- Avoid prolonged or excessive exposure to direct and/or artificial sunlight while taking this medication.  Finish all this medication unless otherwise directed by prescriber.  Medication should be taken with plenty of water.    -- Indication: For MRSA PNA    Bactrim  mg-160 mg oral tablet  -- 1 tab(s) by mouth 2 times a day MDD:two tablets daily  -- Avoid prolonged or excessive exposure to direct and/or artificial sunlight while taking this medication.  Finish all this medication unless otherwise directed by prescriber.  Medication should be taken with plenty of water.    -- Indication: For MRSA PNA    memantine 10 mg oral tablet  -- 1 tab(s) by mouth 2 times a day  -- Indication: For dementia    Namenda 10 mg oral tablet  -- 1 tab(s) by mouth 2 times a day  -- Indication: For dementia    Augmentin 500 mg-125 mg oral tablet  -- 1 milligram(s) by mouth 2 times a day MDD:two tablets daily  -- Finish all this medication unless otherwise directed by prescriber.  Take with food or milk.    -- Indication: For aspiration PNA    Augmentin 500 mg-125 mg oral tablet  -- 1 milligram(s) by mouth 2 times a day MDD:two tablets daily  -- Finish all this medication unless otherwise directed by prescriber.  Take with food or milk.    -- Indication: For aspiration PNA

## 2017-02-23 NOTE — SWALLOW VFSS/MBS ASSESSMENT ADULT - ORAL PHASE
Delayed oral transit time/Reduced anterior - posterior transport/Uncontrolled bolus / spillover in mark-pharynx Uncontrolled bolus / spillover in hypopharynx/Delayed oral transit time/Reduced anterior - posterior transport

## 2017-02-23 NOTE — DISCHARGE NOTE ADULT - PATIENT PORTAL LINK FT
“You can access the FollowHealth Patient Portal, offered by [facility], by registering with the following website: [email or web address]”

## 2017-02-23 NOTE — DISCHARGE NOTE ADULT - CARE PROVIDER_API CALL
[facility],   [address] [place] [address]  Phone: [phone]  Fax: (   )    -.  Phone: (   )    -  Fax: (   )    -

## 2017-02-27 NOTE — PROVIDER CONTACT NOTE (OTHER) - BACKGROUND
admitted for hypoxic RF 2/2 MRSA PNA s/p admitted for hypoxic RF 2/2 MRSA PNA s/p intubation.  hx sepsis, schizophrenia, parkinson's disease, dementia, CAD, BPH, HTN

## 2017-03-03 NOTE — PROVIDER CONTACT NOTE (OTHER) - DATE AND TIME:
03-Mar-2017 07:40
27-Feb-2017 22:47
01-Mar-2017 21:51
02-Mar-2017 07:40
03-Mar-2017 17:00
22-Feb-2017 21:50
22-Feb-2017 23:30
23-Feb-2017 02:05
23-Feb-2017 05:58
28-Feb-2017 13:00

## 2017-03-03 NOTE — PROVIDER CONTACT NOTE (OTHER) - REASON
Diastolic below parameter
HR >100
No daily weights ordered.
Pt with  low grade temp 100.1f
Vitals notification
elevated temp , elevated HR
elevated temp during repeat VS check
heart rate>110
temperature elevated, HR elevated, drop in BP from previous VS
Elevated Heart Rate
temp 101.2,

## 2017-03-03 NOTE — PROVIDER CONTACT NOTE (OTHER) - SITUATION
Diastolic below parameter
Patient febrile. Vitals notification
Patient had dietary consult. recommendations state to have daily weights ordered. no active order noted.
Pt with low grade temp 100.1f
elevated temp    100.4
elevated temp during repeat VS check    100.6
temp 101.2, 
temperature elevated, HR elevated, drop in BP from previous VS. Md notified. received tylenol on 9s before being transferred. temp lower than previous temp.
Elevated Heart Rate
HR >100
heart rate 111

## 2017-03-03 NOTE — PROVIDER CONTACT NOTE (OTHER) - ASSESSMENT
, asymptomatic
Diastolic below parameter
asymptomatic
pt moist.temp 100.6  BP-116/49  HR-59
pt non verbal.
A&Ox0, non-verbal, warm to touch, blankets removed, cool packs in place
Elevated Heart Rate
Patient had dietary consult. recommendations state to have daily weights ordered. no active order noted.
pt moist.temp 100.4  BP-105/59  HR-105
temp 100.1f axillary
Patient febrile. Vitals notification

## 2017-03-03 NOTE — PROVIDER CONTACT NOTE (OTHER) - RECOMMENDATIONS
MD notified.
MD notified
Tylenol administered.
no intervention
no intervention, continue to monitor
recheck later
Blood culture
MD put in order for daily weights
Tylenol and re-assess
continue IVF and tylenol.
continue IVF and tylenol. EKG

## 2017-03-05 NOTE — ED PROCEDURE NOTE - PROCEDURE ADDITIONAL DETAILS
Focused ED TTE indication:  Grey scale imaging obtained in four views ( parasternal long, parasternal short, apical)  no pericardial effusion noted.  no gross wall motion abnormalities, mildly decreased LV contractility     Impression: no pericardial effusion, mildly decreased contractility  [place]  [id number]

## 2017-03-05 NOTE — ED PROVIDER NOTE - OBJECTIVE STATEMENT
patient complaining of from facility for tachypnea. note from facility states found patient to be tachypneic and hypoxic, 68% on room air.  has molst form, DNR DNI. patient complaining of from facility for tachypnea. note from facility states found patient to be tachypneic and hypoxic, 68% on room air.  has molst form, DNR DNI.  baseline unclear from facility paperwork, but patient came with cushion that is used inbetween knees.  patient non verbal. patient complaining of from facility for tachypnea. note from facility states found patient to be tachypneic and hypoxic, 68% on room air.  has molst form, DNR DNI.  baseline unclear from facility paperwork, but patient came with cushion that is used in between knees.  patient non verbal.

## 2017-03-05 NOTE — ED PROVIDER NOTE - ATTENDING CONTRIBUTION TO CARE
I performed a face to face evaluation of this patient and performed a history and physical examination on the patient.  I agree with the resident's history, physical examination, and plan of the patient. Please see my MDM above.

## 2017-03-05 NOTE — ED PROVIDER NOTE - PROGRESS NOTE DETAILS
Doreen Edge MD PGY3 Doreen Edge MD PGY3  Intially contated goddaughter  izabela cavazos--said she is not the health care proxy and all matters pertaing to goals of care should be discussed w/ sister, Holland Baldwin. She call contacted regarding her brother's condition and request no aggressive measurements including CPR and intubation. It was documented on the DNR form, family made aware.

## 2017-03-05 NOTE — H&P ADULT. - ATTENDING COMMENTS
71M PMH schizophrenia, Parkinson's d/o (baseline non-verbal), HTN, CAD, BPH  send  from [facility] for tachypnea and hypoxia. Patient recently discharged (3/3/17) s/p MICU course for hypoxic RF 2/2 aspiration PNA +MRSA requiring intubation.    Pt condition better than 3/5/17 no distress on 2 lit o2 sat  ok .   Before his recent d/c   long  discussion with pt's surrogate (sister) was held on prior admission resulting in patient now DNR/DNI and c/w pleasure feeds given known risk of aspiration.   In ED:  T 101F, , /71, RR 20 SaO2 95%   s/p vanc + zosyn.   pt hemodynamically stable will ask palliative/ hospice to see him

## 2017-03-05 NOTE — ED PROVIDER NOTE - MEDICAL DECISION MAKING DETAILS
Attending Note (Nathan): patient bibems for hypoxia and tachypnea. in ED, patient placed on non rebreather, hypoxia improved, tachypnea present. patient non verbal.  DNR/DNI confirmed with patient's hcp.  jossy aspiration pneumonia - will start treatment for hcap given recent discharge.  as per old records, patient at baseline is non verbal and bedbound.  will admit.

## 2017-03-05 NOTE — H&P ADULT. - PROBLEM SELECTOR PLAN 10
Discussed care with pt's sister who is aware of guarded prognosis. Patient is DNR/DNI. Sister would like pt to receive pleasure feeds. Will complete MOLST form.

## 2017-03-05 NOTE — H&P ADULT. - PROBLEM SELECTOR PLAN 1
- c/w vancomycin by level given MATEUSZ  - c/w zosyn and azithromycin  - c/w aspiration precautions and supplemental O2 to maintain SaO2 > 90%

## 2017-03-05 NOTE — ED PROVIDER NOTE - CRITICAL CARE PROVIDED
documentation/direct patient care (not related to procedure)/telephone consultation with the patient's family/conducted a detailed discussion of DNR status/consult w/ pt's family directly relating to pts condition/additional history taking/interpretation of diagnostic studies

## 2017-03-05 NOTE — ED ADULT TRIAGE NOTE - CHIEF COMPLAINT QUOTE
pt BIBA from [facility].  as per EMS, pt has SOB.  pt at baseline mental status, responsive to tactile stimuli.  arrives on 100% NRB.  pt is being treated for MRSA in the sputum

## 2017-03-05 NOTE — H&P ADULT. - HISTORY OF PRESENT ILLNESS
71M PMH schizophrenia, Parkinson's d/o (baseline non-verbal), HTN, CAD, BPH brought in from [facility] for tachypnea and hypoxia. Patient recently discharged (3/3/17) s/p MICU course of hypoxic RF 2/2 aspiration PNA +MRSA.     In ED:  T 101F, , /71, RR 20 SaO2 95%   s/p vanc + zosyn 71M PMH schizophrenia, Parkinson's d/o (baseline non-verbal), HTN, CAD, BPH brought in from [facility] for tachypnea and hypoxia. Patient recently discharged (3/3/17) s/p MICU course for hypoxic RF 2/2 aspiration PNA +MRSA. Patient made DNR/DNI and to continue with pleasure feeds given guarded prognosis     In ED:  T 101F, , /71, RR 20 SaO2 95%   s/p vanc + zosyn 71M PMH schizophrenia, Parkinson's d/o (baseline non-verbal), HTN, CAD, BPH brought in from [facility] for tachypnea and hypoxia. Patient recently discharged (3/3/17) s/p MICU course for hypoxic RF 2/2 aspiration PNA +MRSA requiring intubation.  Extensive discussion with pt's surrogate (sister) was held on prior admission resulting in patient now DNR/DNI and c/w pleasure feeds given known risk of aspiration.     In ED:  T 101F, , /71, RR 20 SaO2 95%   s/p vanc + zosyn

## 2017-03-05 NOTE — H&P ADULT. - ASSESSMENT
71M PMH schizophrenia, Parkinson's d/o (baseline non-verbal), HTN, CAD, BPH brought in from [facility] for tachypnea and hypoxia likely 2/2 aspiration PNA in the setting of Parkinson's dementia and recent admission for aspiration pneumonitis.

## 2017-03-05 NOTE — ED PROCEDURE NOTE - PROCEDURE ADDITIONAL DETAILS
Focused ED ultrasound of the lungs and pleura:    Findings: Normal lung sliding bilaterally  No signs of interstitial syndrome  No pleural effusions  No pneumonia visualized    Impression: Normal focused lung ultrasound    [facility]  [id number]

## 2017-03-05 NOTE — ED ADULT NURSE NOTE - OBJECTIVE STATEMENT
Pt received at 12:30pm shift change.  Pt is non-verbal baseline per primary RN Karen, Pt is aaox0 awake.  DNR in chart.  Pt is on 100% O2 via NRB

## 2017-03-05 NOTE — ED ADULT NURSE NOTE - CHIEF COMPLAINT QUOTE
pt BIBA from [facility].  as per EMS, pt has SOB.  pt at baseline mental status, responsive to tactile stimuli.  arrives on 100% NRB.  pt is being treated for MRSA in the sputum

## 2017-03-07 NOTE — DISCHARGE NOTE ADULT - HOSPITAL COURSE
71M PMH schizophrenia, Parkinson's d/o (baseline non-verbal), HTN, CAD, BPH brought in from [facility] for tachypnea and hypoxia. Patient recently discharged (3/3/17) s/p MICU course for hypoxic RF 2/2 aspiration PNA +MRSA requiring intubation.  Extensive discussion with pt's surrogate (sister) was held on prior admission resulting in patient now DNR/DNI and c/w pleasure feeds given known risk of aspiration.     In ED:  T 101F, , /71, RR 20 SaO2 95%   s/p vanc + zosyn 71M PMH schizophrenia, Parkinson's d/o (baseline non-verbal), HTN, CAD, BPH brought in from [facility] for tachypnea and hypoxia. Patient recently discharged (3/3/17) s/p MICU course for hypoxic RF 2/2 aspiration PNA +MRSA requiring intubation.  Extensive discussion with pt's surrogate (sister) was held on prior admission resulting in patient now DNR/DNI and c/w pleasure feeds given known risk of aspiration.     In ED:  T 101F, , /71, RR 20 SaO2 95%   s/p vanc + zosyn    Patient admitted to general medicine for further management. Patient was continued on vanc/zosyn/azithro with minimal improvement in clinical status. Hospice was consulted for placement. 71M PMH schizophrenia, Parkinson's d/o (baseline non-verbal), HTN, CAD, BPH brought in from [facility] for tachypnea and hypoxia. Patient recently discharged (3/3/17) s/p MICU course for hypoxic RF 2/2 aspiration PNA +MRSA requiring intubation.  Extensive discussion with pt's surrogate (sister) was held on prior admission resulting in patient now DNR/DNI and c/w pleasure feeds given known risk of aspiration.     In ED:  T 101F, , /71, RR 20 SaO2 95%   s/p vanc + zosyn    Patient admitted to general medicine for further management. Patient was continued on vanc/zosyn/azithro with minimal improvement in clinical status. Hospice was consulted for placement. Aspiration precautions were instated. Primary team discussed guarded prognosis with patient's family. A MOLST form was completed with understanding for patient to receive full comfort care. 71M PMH schizophrenia, Parkinson's d/o (baseline non-verbal), HTN, CAD, BPH brought in from [facility] for tachypnea and hypoxia. Patient recently discharged (3/3/17) s/p MICU course for hypoxic RF 2/2 aspiration PNA +MRSA requiring intubation.  Extensive discussion with pt's surrogate (sister) was held on prior admission resulting in patient now DNR/DNI and c/w pleasure feeds given known risk of aspiration.     In ED:  T 101F, , /71, RR 20 SaO2 95%   s/p vanc + zosyn    Patient admitted to general medicine for further management. Patient was continued on vanc/zosyn/azithro with minimal improvement in clinical status. Hospice was consulted for placement. Aspiration precautions were instated. Primary team discussed guarded prognosis with patient's family. A MOLST form was completed with understanding for patient to receive full comfort care, DNR/DNI.

## 2017-03-07 NOTE — DISCHARGE NOTE ADULT - VISION (WITH CORRECTIVE LENSES IF THE PATIENT USUALLY WEARS THEM):
Partially impaired: cannot see medication labels or newsprint, but can see obstacles in path, and the surrounding layout; can count fingers at arm's length/as per sister wears glasses

## 2017-03-07 NOTE — DISCHARGE NOTE ADULT - PATIENT PORTAL LINK FT
“You can access the FollowHealth Patient Portal, offered by [facility], by registering with the following website: [email or web address]”

## 2017-03-07 NOTE — DIETITIAN INITIAL EVALUATION ADULT. - DIET TYPE
supplement (specify)/No Carb Prosource (1 pkg = 15 gms Protein) Qty per day: 1; Ensure Pudding 1 can x 3 daily (will provide additional ~510 Kcal, ~12 gm Protein);/dysphagia 1, pureed, honey consistency fluid

## 2017-03-07 NOTE — DISCHARGE NOTE ADULT - CARE PROVIDER_API CALL
Liane Cortés), Medicine  [address] [place] [address]  Phone: [phone]  Fax: [phone]

## 2017-03-07 NOTE — DISCHARGE NOTE ADULT - PLAN OF CARE
Management Please f/u with your PCP within 1 week of discharge. If symptoms persists or worsen, please return to the ED. Please c/w sinemet and f/u with your PCP within 1-2 weeks after d/c. Please continue with lorazepam 0.5mg at bedtime. Please f/u with your PCP for BP management. Please c/w medications as instructed. Please c/w tamsulosin as instructed.

## 2017-03-07 NOTE — DIETITIAN INITIAL EVALUATION ADULT. - NS AS NUTRI INTERV MEALS SNACK
Texture-modified diet/1. Suggest: Continue current PO diet order, which remains appropriate at this time;              2. Monitor PO diet tolerance;             3. Monitor labs, weights, hydration status;/Other (specify)

## 2017-03-07 NOTE — DISCHARGE NOTE ADULT - CARE PLAN
Principal Discharge DX:	Aspiration pneumonitis  Goal:	Management  Instructions for follow-up, activity and diet:	Please f/u with your PCP within 1 week of discharge. If symptoms persists or worsen, please return to the ED.  Secondary Diagnosis:	Parkinson disease  Instructions for follow-up, activity and diet:	Please c/w sinemet and f/u with your PCP within 1-2 weeks after d/c.  Secondary Diagnosis:	Schizophrenia  Instructions for follow-up, activity and diet:	Please continue with lorazepam 0.5mg at bedtime.  Secondary Diagnosis:	Hypertension  Instructions for follow-up, activity and diet:	Please f/u with your PCP for BP management.  Secondary Diagnosis:	CAD (coronary artery disease)  Instructions for follow-up, activity and diet:	Please c/w medications as instructed.  Secondary Diagnosis:	BPH (benign prostatic hyperplasia)  Instructions for follow-up, activity and diet:	Please c/w tamsulosin as instructed.

## 2017-03-07 NOTE — DISCHARGE NOTE ADULT - SECONDARY DIAGNOSIS.
Parkinson disease Schizophrenia Hypertension CAD (coronary artery disease) BPH (benign prostatic hyperplasia)

## 2017-03-07 NOTE — DIETITIAN INITIAL EVALUATION ADULT. - OTHER INFO
Nutrition Consult X enteral nutrition evaluation. Pt 72 yo male non-verbal from [facility]. Pt appears confused; no family @ bed side. Spoke to nurse. Per nurse Pt's PO intake/appetite poor; Pt needs total assistance. No vomiting/diarrhea reported @ present. DNR status noted. Per MD progress note: GOC - MOLT completed, Pt is full comfort care, hospice consulted for placement.

## 2017-03-08 NOTE — PROVIDER CONTACT NOTE (OTHER) - ACTION/TREATMENT ORDERED:
Spoke to Cami/ and clarified with Dr. Borrero that  as per family, ok to give sntibiotics. Continue to monitor
Spoke to Dr. Borrero and ordered D5% at 50 ml/hr. Encourage to eat being a feeder with assistance. Continue to monitor
Spoke to Dr. Borrero. No new orders because patient is comfort care. Hospice still under evaluation. Continue to monitor
Spoke to Dr. Borrero. Ordered Morphine for resp. distress PRN. Informed on MEWS SCORE 7 update w/ no new orders. Continue to monitor
Spoke to Dr. Potetr/ HS3  and immediately came to room. Assessed pt and pronounced patient as  status. Dr. Potter called family member. {Post mortem care done. AM Jeane made aware
Spoke to Dr. Rooney and no need to escalate. Pt is on comfort care and hospice. continue to monitor
Tylenol given as per provider. Provider to assess patient.
Continue to assess pt.
Medicated as per MD orders.
Spoke to Dr. Borrero/ HS3. Ordered Tylenol supp. No new orders. Continue to monitor
Will continue to assess patient.
Give tylenol as ordered.
Patient kept cool and comfortable. Will continue to monitor.
Placed cooling packs on patient. Handoff report given to night shift RN.
Pt kept comfortable and cool. Will continue to monitor patient.
Tylenol given at 1848. Patient cleaned and made comfortable.
Will continue to monitor patient and will give tylenol.

## 2017-03-08 NOTE — PROVIDER CONTACT NOTE (OTHER) - NAME OF MD/NP/PA/DO NOTIFIED:
Dr. Borrero
Dr. Borrero/  3
Dr. Borrero/ HS
Dr. Potter/  3
HS Team #3 at bedside
House team 3 resident
Lindsey Potter MD
Team 3 Provider
Team 3 Dr. Potter.
Team 3 Provider

## 2017-03-08 NOTE — PROVIDER CONTACT NOTE (OTHER) - DATE AND TIME:
06-Mar-2017 14:15
06-Mar-2017 18:28
07-Mar-2017 11:44
07-Mar-2017 17:18
07-Mar-2017 20:50
08-Mar-2017 19:40
01-Mar-2017 17:50
06-Mar-2017 10:00
07-Mar-2017 12:40
07-Mar-2017 14:24
08-Mar-2017 06:25
08-Mar-2017 10:30
08-Mar-2017 10:30
08-Mar-2017 11:20
08-Mar-2017 11:35
08-Mar-2017 15:29
15-Mar-2017 15:30

## 2017-03-08 NOTE — PROVIDER CONTACT NOTE (OTHER) - REASON
Clarification of DO NOT USE ANTIBIOTICS
Elevated temperature
Elevated temperature 100.9 and heart rate 108
MEWS Score 7 Update
Observed patient with no pulse
Patient RR is  30 with low pulse ox at 90% non-rebreather w/ 15L O2
Patient has low pulse ox and temp at 100.5F, unable to take ASA PO
Patient temperature = 100.8
Patient was not eating well for breakfast 0% meal with feeding assistance
Vs taken with labored breathing
Patients temp 101, 
Temperature elevated
Elevated HR.
Elevated temperature
Temp continues to be elevated.
Temperature elevated 100.8
Elevated BP

## 2017-03-08 NOTE — PROVIDER CONTACT NOTE (OTHER) - RECOMMENDATIONS
Hospice RN spoke to son about hospice care and tried to refax the paper works
MEWS scored went to 7 at 1115H and 1120H
Monitor patient and team 3 will assess patient.
Give Tylenol and keep patient cool.
Keep patient cool. Monitor and assess. Provider will assess.
MD to evaluate patient.
Monitor patient give tylenol.
Monitor patient. Give antibiotics as per order.
Provider will assess patient. Keep patient cool and monitor,

## 2017-03-08 NOTE — DISCHARGE NOTE FOR THE EXPIRED PATIENT - HOSPITAL COURSE
71M PMH schizophrenia, Parkinson's d/o (baseline non-verbal), HTN, CAD, BPH brought in from [facility] for tachypnea and hypoxia. Patient recently discharged (3/3/17) s/p MICU course for hypoxic RF 2/2 aspiration PNA +MRSA requiring intubation.  Extensive discussion with pt's surrogate (sister) was held on prior admission resulting in patient now DNR/DNI and c/w pleasure feeds given known risk of aspiration.     In ED:  T 101F, , /71, RR 20 SaO2 95%   s/p vanc + zosyn    Patient admitted to general medicine for further management. Patient was continued on vanc/zosyn/azithro with minimal improvement in clinical status. Hospice was consulted for placement. Aspiration precautions were instated. Primary team discussed guarded prognosis with patient's family. A MOLST form was completed with understanding for patient to receive full comfort care, DNR/DNI.    Called to bedside by RN as patient noted to have no spontaneous respirations. On assessment, patient had absent heart sounds on auscultation, no spontaneous respirations, absent pulses, absent pupillary response to light, absent corneal reflex and no response to painful stimuli. Patient's sister was informed at [phone], family declined autopsy. Patient was pronounced at 17:50 on 3/8/17.

## 2017-03-08 NOTE — PROVIDER CONTACT NOTE (OTHER) - BACKGROUND
Checked MOLST form and noted with X on DO NOT USE ANTIBIOTIC
Patient diagnosed with aspirations pnuemonia.
Patient was admitted for aspiration PNA and placed on contact precaution due to h/o of MRSA on sputum
Diagnosed with aspiration pneumonia.
Diagnosed with aspirations pneumonia.
Patient diagnosis with Aspiration pnuemonia.
Patient diagnosis with aspiration pnuemonia.
Patient temperature fluctuating over last few days, tylenol ordered PRN.
Pt. admitted for PNA and MRSA in sputum.
Patient is DNR and comfort care. Son approved hospice care over the phone as per Hospice RN Tori. Awaiting for paper works to refax to hospice nurse
Aspirations pneumonia.

## 2017-03-08 NOTE — PROVIDER CONTACT NOTE (OTHER) - ASSESSMENT
Alert and nonverbal. PCA tried ti feed patient but patient is not eating. Vs taken w/ O2 5L. No s/s of facial distress or any pain
Alert and responsive to stimuli. Retake VS after 15 minutes. Observed patient calm w/ non-labored breathing. Continue on condom catheter and contact precaution
Observed patient w/ labored breathing 30 RR w/ llow pulse ox at 90% non-rebreather 15L O2. Vs taken and increased sweating. Changed gown and placed him in comfortable position. Given Morphine 4 mg IV push for respiratory distress.
PCA came to RN and informed that patient felt very cold and non-responsive. Immediately came to room and checked patient. Observed with one agonal gasp. Checked VS but unable to take it. Checked carotid artery w/ no pulse
Re-assessed patient after giving morphine. Patient still had increased labored breathing . continue on 15L O2 non-rebreather. Contact precaution maintained
Alert and responsive at baseline. No distress noted.
Alert, responsive to stimuli and non-verbal. VS taken w/ low pulse ox at 79-80% w/ 5L O2. Placed patient on non-rebreather w/ 15LO2 but pulse ox went to 86%. Pt feels warm w/ axillary temp at 98.6F. Retook rectal temp at 100.5F. Pt is unable to take ASA at 1200H.
No distress noted. Alert and responsive at baseline.
Alert and responsive at baseline. No distress noted.
Alert and responsive at baseline. No distress noted.
Alert and responsive at baseline. Patient resting in bed comfortably with no distress noted.
No distress noted. Alert and responsive at baseline.
Patient vital signs in eMAR.  No acute distress noted.
VS stable except for elevated BP. No distress noted.
VS stable except for elevated HR. No distress noted.
VS stable except for elevated temperature, no distress noted.

## 2017-03-08 NOTE — PROVIDER CONTACT NOTE (MEDICATION) - BACKGROUND
Tried to take medication with apple sauce for 1400H med but patient is unable to tale the medication .Suction patient as need w/ oral secretion s/p chest PT.
Patient on dysphagia 1 diet.

## 2017-03-08 NOTE — PROVIDER CONTACT NOTE (OTHER) - SITUATION
Pt. presents with elevated BP of 181/87
Elevated temperature and heart rate.
Elevated temperature during vital signs check.
Elevated temperature.
Patient rectal temperature = 100.8 at 0625.
Patient with elevated temperature and heart rate.
Pt temperature and heart rate elevated.
Pt. presents with elevated HR.
Pt. presents with elevated temperature.
Temperature continues to be elevated.

## 2017-03-10 LAB
BACTERIA BLD CULT: SIGNIFICANT CHANGE UP
BACTERIA BLD CULT: SIGNIFICANT CHANGE UP
BACTERIA SPT RESP CULT: SIGNIFICANT CHANGE UP

## 2018-11-08 NOTE — ED PROVIDER NOTE - PHYSICAL EXAMINATION
Pharmacy f/u on RX pending Attending  pt being bagged, unresponsive, dry mm, mucus in mounth, tachycardic, dec breath sound bl,  soft nondistended abdomen   no sign of head/facial trauma

## 2020-02-03 NOTE — PROVIDER CONTACT NOTE (OTHER) - NAME OF MD/NP/PA/DO NOTIFIED:
DR Beaver
Dr. Alvarez
Dr. MCKEON
Dr. Potter
Dr. Valladares #[id number]
Lindsey Potter
Luis Alvarez
abrahan
No

## 2020-08-10 NOTE — H&P ADULT. - PSYCHIATRIC
not applicable Cephalexin Counseling: I counseled the patient regarding use of cephalexin as an antibiotic for prophylactic and/or therapeutic purposes. Cephalexin (commonly prescribed under brand name Keflex) is a cephalosporin antibiotic which is active against numerous classes of bacteria, including most skin bacteria. Side effects may include nausea, diarrhea, gastrointestinal upset, rash, hives, yeast infections, and in rare cases, hepatitis, kidney disease, seizures, fever, confusion, neurologic symptoms, and others. Patients with severe allergies to penicillin medications are cautioned that there is about a 10% incidence of cross-reactivity with cephalosporins. When possible, patients with penicillin allergies should use alternatives to cephalosporins for antibiotic therapy.

## 2021-06-09 NOTE — DISCHARGE NOTE ADULT - NS MD DC FALL RISK RISK
Signed   In scan box For information on Fall & Injury Prevention, visit [email or web address]

## 2022-01-31 NOTE — DIETITIAN INITIAL EVALUATION ADULT. - EST PROTEIN NEEDS8
Detail Level: Zone
Plan: Compression stockings and elevate legs above heart when sitting down
89.88
None